# Patient Record
Sex: FEMALE | Race: ASIAN | NOT HISPANIC OR LATINO
[De-identification: names, ages, dates, MRNs, and addresses within clinical notes are randomized per-mention and may not be internally consistent; named-entity substitution may affect disease eponyms.]

---

## 2018-08-23 ENCOUNTER — TRANSCRIPTION ENCOUNTER (OUTPATIENT)
Age: 38
End: 2018-08-23

## 2018-08-23 ENCOUNTER — LABORATORY RESULT (OUTPATIENT)
Age: 38
End: 2018-08-23

## 2018-08-23 ENCOUNTER — APPOINTMENT (OUTPATIENT)
Dept: INTERNAL MEDICINE | Facility: CLINIC | Age: 38
End: 2018-08-23
Payer: COMMERCIAL

## 2018-08-23 VITALS
BODY MASS INDEX: 21.71 KG/M2 | WEIGHT: 118 LBS | TEMPERATURE: 97.8 F | SYSTOLIC BLOOD PRESSURE: 104 MMHG | DIASTOLIC BLOOD PRESSURE: 72 MMHG | HEIGHT: 62 IN

## 2018-08-23 DIAGNOSIS — Z78.9 OTHER SPECIFIED HEALTH STATUS: ICD-10-CM

## 2018-08-23 DIAGNOSIS — Z82.3 FAMILY HISTORY OF STROKE: ICD-10-CM

## 2018-08-23 DIAGNOSIS — Z83.3 FAMILY HISTORY OF DIABETES MELLITUS: ICD-10-CM

## 2018-08-23 PROCEDURE — 90472 IMMUNIZATION ADMIN EACH ADD: CPT

## 2018-08-23 PROCEDURE — 99385 PREV VISIT NEW AGE 18-39: CPT | Mod: 25

## 2018-08-23 PROCEDURE — 36415 COLL VENOUS BLD VENIPUNCTURE: CPT

## 2018-08-23 PROCEDURE — 90632 HEPA VACCINE ADULT IM: CPT

## 2018-08-23 PROCEDURE — 90715 TDAP VACCINE 7 YRS/> IM: CPT

## 2018-08-23 PROCEDURE — 90471 IMMUNIZATION ADMIN: CPT

## 2018-08-23 RX ORDER — CHROMIUM 200 MCG
TABLET ORAL
Refills: 0 | Status: ACTIVE | COMMUNITY

## 2018-08-23 NOTE — HEALTH RISK ASSESSMENT
[Good] : ~his/her~  mood as  good [0] : 2) Feeling down, depressed, or hopeless: Not at all (0) [HIV Test offered] : HIV Test offered [Hepatitis C test offered] : Hepatitis C test offered [With Family] : lives with family [Single] : single [] : No [de-identified] : social [[id number]] : 0 [Sexually Active] : not sexually active [Reports changes in hearing] : Reports no changes in hearing [Reports changes in vision] : Reports no changes in vision [Reports changes in dental health] : Reports no changes in dental health [de-identified] : glasses

## 2018-08-23 NOTE — PHYSICAL EXAM
[No Acute Distress] : no acute distress [Well Nourished] : well nourished [Well Developed] : well developed [Well-Appearing] : well-appearing [Normal Sclera/Conjunctiva] : normal sclera/conjunctiva [PERRL] : pupils equal round and reactive to light [EOMI] : extraocular movements intact [Normal Outer Ear/Nose] : the outer ears and nose were normal in appearance [Normal Oropharynx] : the oropharynx was normal [Normal TMs] : both tympanic membranes were normal [No JVD] : no jugular venous distention [Supple] : supple [No Lymphadenopathy] : no lymphadenopathy [Thyroid Normal, No Nodules] : the thyroid was normal and there were no nodules present [No Respiratory Distress] : no respiratory distress  [Clear to Auscultation] : lungs were clear to auscultation bilaterally [No Accessory Muscle Use] : no accessory muscle use [Normal Rate] : normal rate  [Regular Rhythm] : with a regular rhythm [Normal S1, S2] : normal S1 and S2 [No Murmur] : no murmur heard [No Carotid Bruits] : no carotid bruits [No Abdominal Bruit] : a ~M bruit was not heard ~T in the abdomen [No Varicosities] : no varicosities [Pedal Pulses Present] : the pedal pulses are present [No Edema] : there was no peripheral edema [No Extremity Clubbing/Cyanosis] : no extremity clubbing/cyanosis [No Palpable Aorta] : no palpable aorta [Normal Appearance] : normal in appearance [No Nipple Discharge] : no nipple discharge [No Axillary Lymphadenopathy] : no axillary lymphadenopathy [Soft] : abdomen soft [Non Tender] : non-tender [Non-distended] : non-distended [No Masses] : no abdominal mass palpated [No HSM] : no HSM [Normal Bowel Sounds] : normal bowel sounds [Normal Supraclavicular Nodes] : no supraclavicular lymphadenopathy [Normal Posterior Cervical Nodes] : no posterior cervical lymphadenopathy [Normal Anterior Cervical Nodes] : no anterior cervical lymphadenopathy [Normal Inguinal Nodes] : no inguinal lymphadenopathy [No CVA Tenderness] : no CVA  tenderness [No Spinal Tenderness] : no spinal tenderness [No Joint Swelling] : no joint swelling [Grossly Normal Strength/Tone] : grossly normal strength/tone [No Rash] : no rash [No Skin Lesions] : no skin lesions [Normal Gait] : normal gait [Coordination Grossly Intact] : coordination grossly intact [No Focal Deficits] : no focal deficits [Deep Tendon Reflexes (DTR)] : deep tendon reflexes were 2+ and symmetric [Normal Affect] : the affect was normal [Alert and Oriented x3] : oriented to person, place, and time [Normal Insight/Judgement] : insight and judgment were intact [de-identified] : right breast 4cm mass at 1200

## 2018-08-23 NOTE — PLAN
[FreeTextEntry1] : Depression screening negative\par Check labs today including HIV/STDs. Will also check titers.\par Hepatitis A vaccine today. Return 6 months for second dose\par Tdap today\par Given a prescription for a mammogram and breast ultrasound.\par Appointment scheduled with gynecology\par

## 2018-08-23 NOTE — HISTORY OF PRESENT ILLNESS
[FreeTextEntry1] : annual physical [de-identified] : 38-year-old female with no past medical history who is here for a annual physical. She comes to the office frequently however with her father. Her mother was also a patient here.\par She travels a lot for pleasure. She knows that she is not immune to hepatitis A and would like to get hepatitis A vaccine because of her travel. She also notes that her tetanus shot is not up to date. She vaguely remembers not being immune to hepatitis B or MMR about 10 years ago.\par She has not seen a gynecologist in several years but has an appointment scheduled for next month.\par Recently she felt a lump in her right breast.

## 2018-08-26 ENCOUNTER — FORM ENCOUNTER (OUTPATIENT)
Age: 38
End: 2018-08-26

## 2018-08-27 ENCOUNTER — APPOINTMENT (OUTPATIENT)
Dept: MAMMOGRAPHY | Facility: CLINIC | Age: 38
End: 2018-08-27
Payer: COMMERCIAL

## 2018-08-27 ENCOUNTER — OUTPATIENT (OUTPATIENT)
Dept: OUTPATIENT SERVICES | Facility: HOSPITAL | Age: 38
LOS: 1 days | End: 2018-08-27
Payer: COMMERCIAL

## 2018-08-27 ENCOUNTER — APPOINTMENT (OUTPATIENT)
Dept: ULTRASOUND IMAGING | Facility: CLINIC | Age: 38
End: 2018-08-27
Payer: COMMERCIAL

## 2018-08-27 DIAGNOSIS — R92.8 OTHER ABNORMAL AND INCONCLUSIVE FINDINGS ON DIAGNOSTIC IMAGING OF BREAST: ICD-10-CM

## 2018-08-27 PROCEDURE — G0279: CPT

## 2018-08-27 PROCEDURE — 76641 ULTRASOUND BREAST COMPLETE: CPT | Mod: 26,50

## 2018-08-27 PROCEDURE — 76641 ULTRASOUND BREAST COMPLETE: CPT

## 2018-08-27 PROCEDURE — G0279: CPT | Mod: 26

## 2018-08-27 PROCEDURE — 77066 DX MAMMO INCL CAD BI: CPT | Mod: 26

## 2018-08-27 PROCEDURE — 77066 DX MAMMO INCL CAD BI: CPT

## 2018-08-29 ENCOUNTER — TRANSCRIPTION ENCOUNTER (OUTPATIENT)
Age: 38
End: 2018-08-29

## 2018-08-29 LAB
25(OH)D3 SERPL-MCNC: 38.7 NG/ML
ALBUMIN SERPL ELPH-MCNC: 4.7 G/DL
ALP BLD-CCNC: 47 U/L
ALT SERPL-CCNC: 6 U/L
ANION GAP SERPL CALC-SCNC: 13 MMOL/L
APPEARANCE: CLEAR
AST SERPL-CCNC: 19 U/L
BACTERIA: NEGATIVE
BASOPHILS # BLD AUTO: 0.02 K/UL
BASOPHILS NFR BLD AUTO: 0.3 %
BILIRUB SERPL-MCNC: 0.3 MG/DL
BILIRUBIN URINE: NEGATIVE
BLOOD URINE: NEGATIVE
BUN SERPL-MCNC: 11 MG/DL
C TRACH RRNA SPEC QL NAA+PROBE: NOT DETECTED
CALCIUM SERPL-MCNC: 9.7 MG/DL
CHLORIDE SERPL-SCNC: 99 MMOL/L
CHOLEST SERPL-MCNC: 161 MG/DL
CHOLEST/HDLC SERPL: 4 RATIO
CO2 SERPL-SCNC: 26 MMOL/L
COLOR: YELLOW
CREAT SERPL-MCNC: 0.91 MG/DL
EOSINOPHIL # BLD AUTO: 0.25 K/UL
EOSINOPHIL NFR BLD AUTO: 3.6 %
FERRITIN SERPL-MCNC: 55 NG/ML
GLUCOSE QUALITATIVE U: NEGATIVE MG/DL
GLUCOSE SERPL-MCNC: 84 MG/DL
HBA1C MFR BLD HPLC: 5.3 %
HBV SURFACE AB SER QL: REACTIVE
HBV SURFACE AB SERPL IA-ACNC: 32.6 MIU/ML
HBV SURFACE AG SER QL: NONREACTIVE
HCT VFR BLD CALC: 43.1 %
HCV AB SER QL: ABNORMAL
HCV S/CO RATIO: 1.03 S/CO
HDLC SERPL-MCNC: 40 MG/DL
HGB BLD-MCNC: 13.5 G/DL
HIV1+2 AB SPEC QL IA.RAPID: NONREACTIVE
HYALINE CASTS: 0 /LPF
IMM GRANULOCYTES NFR BLD AUTO: 0.1 %
IRON SATN MFR SERPL: 32 %
IRON SERPL-MCNC: 114 UG/DL
KETONES URINE: NEGATIVE
LDLC SERPL CALC-MCNC: 77 MG/DL
LEUKOCYTE ESTERASE URINE: NEGATIVE
LYMPHOCYTES # BLD AUTO: 1.88 K/UL
LYMPHOCYTES NFR BLD AUTO: 27 %
MAN DIFF?: NORMAL
MCHC RBC-ENTMCNC: 29.2 PG
MCHC RBC-ENTMCNC: 31.3 GM/DL
MCV RBC AUTO: 93.1 FL
MEV IGG FLD QL IA: <5 AU/ML
MEV IGG+IGM SER-IMP: NEGATIVE
MICROSCOPIC-UA: NORMAL
MONOCYTES # BLD AUTO: 0.46 K/UL
MONOCYTES NFR BLD AUTO: 6.6 %
MUV AB SER-ACNC: NEGATIVE
MUV IGG SER QL IA: <5 AU/ML
N GONORRHOEA RRNA SPEC QL NAA+PROBE: NOT DETECTED
NEUTROPHILS # BLD AUTO: 4.35 K/UL
NEUTROPHILS NFR BLD AUTO: 62.4 %
NITRITE URINE: NEGATIVE
PH URINE: 7
PLATELET # BLD AUTO: 300 K/UL
POTASSIUM SERPL-SCNC: 4 MMOL/L
PROT SERPL-MCNC: 7.8 G/DL
PROTEIN URINE: NEGATIVE MG/DL
RBC # BLD: 4.63 M/UL
RBC # FLD: 13.4 %
RED BLOOD CELLS URINE: 2 /HPF
RUBV IGG FLD-ACNC: 0.3 INDEX
RUBV IGG SER-IMP: NEGATIVE
SODIUM SERPL-SCNC: 138 MMOL/L
SOURCE AMPLIFICATION: NORMAL
SPECIFIC GRAVITY URINE: 1.01
SQUAMOUS EPITHELIAL CELLS: 0 /HPF
T PALLIDUM AB SER QL IA: NEGATIVE
TIBC SERPL-MCNC: 354 UG/DL
TRIGL SERPL-MCNC: 218 MG/DL
TSH SERPL-ACNC: 1.37 UIU/ML
UIBC SERPL-MCNC: 240 UG/DL
UROBILINOGEN URINE: NEGATIVE MG/DL
VIT B12 SERPL-MCNC: 1004 PG/ML
VZV AB TITR SER: POSITIVE
VZV IGG SER IF-ACNC: 982.3 INDEX
WBC # FLD AUTO: 6.97 K/UL
WHITE BLOOD CELLS URINE: 0 /HPF

## 2018-08-31 ENCOUNTER — TRANSCRIPTION ENCOUNTER (OUTPATIENT)
Age: 38
End: 2018-08-31

## 2018-09-06 ENCOUNTER — APPOINTMENT (OUTPATIENT)
Dept: GASTROENTEROLOGY | Facility: CLINIC | Age: 38
End: 2018-09-06
Payer: COMMERCIAL

## 2018-09-06 VITALS
HEART RATE: 91 BPM | RESPIRATION RATE: 16 BRPM | OXYGEN SATURATION: 98 % | HEIGHT: 62 IN | SYSTOLIC BLOOD PRESSURE: 116 MMHG | DIASTOLIC BLOOD PRESSURE: 87 MMHG | WEIGHT: 122 LBS | BODY MASS INDEX: 22.45 KG/M2

## 2018-09-06 PROCEDURE — 99204 OFFICE O/P NEW MOD 45 MIN: CPT

## 2018-09-24 ENCOUNTER — APPOINTMENT (OUTPATIENT)
Dept: HEPATOLOGY | Facility: CLINIC | Age: 38
End: 2018-09-24

## 2018-10-15 ENCOUNTER — LABORATORY RESULT (OUTPATIENT)
Age: 38
End: 2018-10-15

## 2018-10-17 LAB
ALBUMIN SERPL ELPH-MCNC: 4.6 G/DL
ALP BLD-CCNC: 45 U/L
ALT SERPL-CCNC: 11 U/L
ANION GAP SERPL CALC-SCNC: 12 MMOL/L
AST SERPL-CCNC: 18 U/L
BILIRUB SERPL-MCNC: 0.3 MG/DL
BUN SERPL-MCNC: 14 MG/DL
CALCIUM SERPL-MCNC: 9.9 MG/DL
CHLORIDE SERPL-SCNC: 103 MMOL/L
CO2 SERPL-SCNC: 28 MMOL/L
CREAT SERPL-MCNC: 0.92 MG/DL
HBV CORE IGG+IGM SER QL: NONREACTIVE
HCV AB SER QL: ABNORMAL
HCV S/CO RATIO: 1.01 S/CO
POTASSIUM SERPL-SCNC: 4.7 MMOL/L
PROT SERPL-MCNC: 7.6 G/DL
SODIUM SERPL-SCNC: 143 MMOL/L

## 2019-02-01 ENCOUNTER — OTHER (OUTPATIENT)
Age: 39
End: 2019-02-01

## 2019-05-08 ENCOUNTER — APPOINTMENT (OUTPATIENT)
Dept: INTERNAL MEDICINE | Facility: CLINIC | Age: 39
End: 2019-05-08
Payer: COMMERCIAL

## 2019-05-08 VITALS
TEMPERATURE: 98.6 F | HEIGHT: 62 IN | DIASTOLIC BLOOD PRESSURE: 70 MMHG | WEIGHT: 123 LBS | SYSTOLIC BLOOD PRESSURE: 98 MMHG | BODY MASS INDEX: 22.63 KG/M2

## 2019-05-08 LAB — HCG UR QL: NEGATIVE

## 2019-05-08 PROCEDURE — 90472 IMMUNIZATION ADMIN EACH ADD: CPT

## 2019-05-08 PROCEDURE — 90471 IMMUNIZATION ADMIN: CPT

## 2019-05-08 PROCEDURE — 90707 MMR VACCINE SC: CPT

## 2019-05-08 PROCEDURE — 90632 HEPA VACCINE ADULT IM: CPT

## 2019-05-08 PROCEDURE — 99212 OFFICE O/P EST SF 10 MIN: CPT | Mod: 25

## 2019-05-08 NOTE — HISTORY OF PRESENT ILLNESS
[FreeTextEntry1] : Hepatitis A and MMR vaccinations [de-identified] : She had hepatitis A vaccination done in August. At that time her physical she had MMR titers which show that she is not immune to any of them. She is here today to have a second hepatitis A and MMR vaccines. She feels well today. no fever/chills

## 2019-05-08 NOTE — PLAN
[FreeTextEntry1] : Hepatitis A and MMR vaccines discussed with patient. Urine pregnancy test negative. Both excellent administered today. Return in one month for second MMR

## 2020-02-06 ENCOUNTER — TRANSCRIPTION ENCOUNTER (OUTPATIENT)
Age: 40
End: 2020-02-06

## 2020-03-11 ENCOUNTER — APPOINTMENT (OUTPATIENT)
Dept: INTERNAL MEDICINE | Facility: CLINIC | Age: 40
End: 2020-03-11
Payer: COMMERCIAL

## 2020-03-11 VITALS
RESPIRATION RATE: 18 BRPM | OXYGEN SATURATION: 99 % | TEMPERATURE: 98.1 F | WEIGHT: 126 LBS | HEART RATE: 81 BPM | BODY MASS INDEX: 23.19 KG/M2 | SYSTOLIC BLOOD PRESSURE: 100 MMHG | HEIGHT: 62 IN | DIASTOLIC BLOOD PRESSURE: 70 MMHG

## 2020-03-11 PROCEDURE — 99395 PREV VISIT EST AGE 18-39: CPT | Mod: 25

## 2020-03-11 PROCEDURE — 36415 COLL VENOUS BLD VENIPUNCTURE: CPT

## 2020-03-11 NOTE — PHYSICAL EXAM
[No Acute Distress] : no acute distress [Well Nourished] : well nourished [Well Developed] : well developed [Well-Appearing] : well-appearing [Normal Sclera/Conjunctiva] : normal sclera/conjunctiva [PERRL] : pupils equal round and reactive to light [EOMI] : extraocular movements intact [Normal Outer Ear/Nose] : the outer ears and nose were normal in appearance [Normal Oropharynx] : the oropharynx was normal [Normal TMs] : both tympanic membranes were normal [No JVD] : no jugular venous distention [No Lymphadenopathy] : no lymphadenopathy [Supple] : supple [Thyroid Normal, No Nodules] : the thyroid was normal and there were no nodules present [No Respiratory Distress] : no respiratory distress  [No Accessory Muscle Use] : no accessory muscle use [Clear to Auscultation] : lungs were clear to auscultation bilaterally [Normal Rate] : normal rate  [Regular Rhythm] : with a regular rhythm [Normal S1, S2] : normal S1 and S2 [No Murmur] : no murmur heard [No Carotid Bruits] : no carotid bruits [No Varicosities] : no varicosities [Pedal Pulses Present] : the pedal pulses are present [No Edema] : there was no peripheral edema [No Extremity Clubbing/Cyanosis] : no extremity clubbing/cyanosis [Normal Appearance] : normal in appearance [No Nipple Discharge] : no nipple discharge [No Axillary Lymphadenopathy] : no axillary lymphadenopathy [Soft] : abdomen soft [Non Tender] : non-tender [Non-distended] : non-distended [No Masses] : no abdominal mass palpated [No HSM] : no HSM [Normal Bowel Sounds] : normal bowel sounds [Normal Posterior Cervical Nodes] : no posterior cervical lymphadenopathy [Normal Anterior Cervical Nodes] : no anterior cervical lymphadenopathy [No CVA Tenderness] : no CVA  tenderness [No Spinal Tenderness] : no spinal tenderness [No Joint Swelling] : no joint swelling [Grossly Normal Strength/Tone] : grossly normal strength/tone [No Rash] : no rash [Coordination Grossly Intact] : coordination grossly intact [No Focal Deficits] : no focal deficits [Normal Gait] : normal gait [Deep Tendon Reflexes (DTR)] : deep tendon reflexes were 2+ and symmetric [Normal Affect] : the affect was normal [Alert and Oriented x3] : oriented to person, place, and time [Normal Insight/Judgement] : insight and judgment were intact

## 2020-03-11 NOTE — HISTORY OF PRESENT ILLNESS
[FreeTextEntry1] : Annual physical [de-identified] : 31-year-old female who is here for an annual physical. At that time her last physical that hepatitis C came back weakly positive. She followed up with GI who thought it was likely a false positive and told her to repeat it in "a couple of years" \par Her father has been in and out of the hospital. Therefore she has not been getting as much exercise as she usually does. She has put on a little weight over the last couple of years.

## 2020-03-11 NOTE — HEALTH RISK ASSESSMENT
[] : No [0] : 2) Feeling down, depressed, or hopeless: Not at all (0) [de-identified] : social [[id number]] : 0 [Patient reported mammogram was normal] : Patient reported mammogram was normal [None] : None [With Family] : lives with family [Employed] : employed [Single] : single [MammogramDate] : 01/19 [FreeTextEntry2] :

## 2020-03-11 NOTE — PLAN
[FreeTextEntry1] : Depression screening negative\par BP is controlled\par Check labs today, including CBC, CMP, TSH, HbA1c, lipids. declined repeat Hep C testing today\par Blood was collected in the office.\par Continue exercise\par pap UTD\par f/u 1 year or prn\par

## 2020-03-17 ENCOUNTER — TRANSCRIPTION ENCOUNTER (OUTPATIENT)
Age: 40
End: 2020-03-17

## 2020-03-17 LAB
25(OH)D3 SERPL-MCNC: 38.8 NG/ML
ALBUMIN SERPL ELPH-MCNC: 4.6 G/DL
ALP BLD-CCNC: 49 U/L
ALT SERPL-CCNC: 7 U/L
ANION GAP SERPL CALC-SCNC: 14 MMOL/L
APPEARANCE: CLEAR
AST SERPL-CCNC: 13 U/L
BACTERIA: NEGATIVE
BASOPHILS # BLD AUTO: 0.03 K/UL
BASOPHILS NFR BLD AUTO: 0.5 %
BILIRUB SERPL-MCNC: 0.3 MG/DL
BILIRUBIN URINE: NEGATIVE
BLOOD URINE: NEGATIVE
BUN SERPL-MCNC: 11 MG/DL
CALCIUM SERPL-MCNC: 9.3 MG/DL
CHLORIDE SERPL-SCNC: 102 MMOL/L
CHOLEST SERPL-MCNC: 175 MG/DL
CHOLEST/HDLC SERPL: 3.3 RATIO
CO2 SERPL-SCNC: 23 MMOL/L
COLOR: NORMAL
CREAT SERPL-MCNC: 0.87 MG/DL
EOSINOPHIL # BLD AUTO: 0.22 K/UL
EOSINOPHIL NFR BLD AUTO: 3.9 %
ESTIMATED AVERAGE GLUCOSE: 111 MG/DL
GLUCOSE QUALITATIVE U: NEGATIVE
GLUCOSE SERPL-MCNC: 96 MG/DL
HBA1C MFR BLD HPLC: 5.5 %
HCT VFR BLD CALC: 40.5 %
HDLC SERPL-MCNC: 52 MG/DL
HGB BLD-MCNC: 13.3 G/DL
HYALINE CASTS: 0 /LPF
IMM GRANULOCYTES NFR BLD AUTO: 0.2 %
KETONES URINE: NEGATIVE
LDLC SERPL CALC-MCNC: 82 MG/DL
LEUKOCYTE ESTERASE URINE: NEGATIVE
LYMPHOCYTES # BLD AUTO: 1.63 K/UL
LYMPHOCYTES NFR BLD AUTO: 28.7 %
MAN DIFF?: NORMAL
MCHC RBC-ENTMCNC: 31 PG
MCHC RBC-ENTMCNC: 32.8 GM/DL
MCV RBC AUTO: 94.4 FL
MICROSCOPIC-UA: NORMAL
MONOCYTES # BLD AUTO: 0.47 K/UL
MONOCYTES NFR BLD AUTO: 8.3 %
NEUTROPHILS # BLD AUTO: 3.31 K/UL
NEUTROPHILS NFR BLD AUTO: 58.4 %
NITRITE URINE: NEGATIVE
PH URINE: 6.5
PLATELET # BLD AUTO: 222 K/UL
POTASSIUM SERPL-SCNC: 3.9 MMOL/L
PROT SERPL-MCNC: 7.3 G/DL
PROTEIN URINE: NEGATIVE
RBC # BLD: 4.29 M/UL
RBC # FLD: 13.2 %
RED BLOOD CELLS URINE: 2 /HPF
SODIUM SERPL-SCNC: 139 MMOL/L
SPECIFIC GRAVITY URINE: 1.01
SQUAMOUS EPITHELIAL CELLS: 0 /HPF
TRIGL SERPL-MCNC: 203 MG/DL
TSH SERPL-ACNC: 2.25 UIU/ML
UROBILINOGEN URINE: NORMAL
WBC # FLD AUTO: 5.67 K/UL
WHITE BLOOD CELLS URINE: 1 /HPF

## 2020-05-15 ENCOUNTER — TRANSCRIPTION ENCOUNTER (OUTPATIENT)
Age: 40
End: 2020-05-15

## 2020-07-09 ENCOUNTER — APPOINTMENT (OUTPATIENT)
Dept: INTERNAL MEDICINE | Facility: CLINIC | Age: 40
End: 2020-07-09
Payer: COMMERCIAL

## 2020-07-09 VITALS
BODY MASS INDEX: 23.19 KG/M2 | WEIGHT: 126 LBS | HEIGHT: 62 IN | TEMPERATURE: 97.3 F | DIASTOLIC BLOOD PRESSURE: 70 MMHG | SYSTOLIC BLOOD PRESSURE: 110 MMHG

## 2020-07-09 PROCEDURE — 99214 OFFICE O/P EST MOD 30 MIN: CPT

## 2020-07-09 NOTE — PHYSICAL EXAM
[No Acute Distress] : no acute distress [Well Nourished] : well nourished [Normal Sclera/Conjunctiva] : normal sclera/conjunctiva [Normal Outer Ear/Nose] : the outer ears and nose were normal in appearance [Normal Appearance] : normal in appearance [No Axillary Lymphadenopathy] : no axillary lymphadenopathy [Declined Rectal Exam] : declined rectal exam [Coordination Grossly Intact] : coordination grossly intact [Normal Gait] : normal gait [Normal Affect] : the affect was normal [de-identified] : palpable breast nodules b/l [Normal Insight/Judgement] : insight and judgment were intact [de-identified] : motor 5/5 b/l LE

## 2020-07-09 NOTE — HISTORY OF PRESENT ILLNESS
[FreeTextEntry8] : 10-year-old female who presented for a tetanus shot.  She states that she is working in her garden a lot and getting a lot of cuts and she did not think that she had had a tetanus shot for more than 10 years.\par She complains about having hard bowel movements.  She states that she has been eating more cheese and since she started doing that her bowel movements are harder.  She states she is not constipated but has noted for the last couple days that there is blood on the toilet paper and a little bit of bright red blood on the end of her stool.  It is not mixed into the stool.  She denies abdominal pain.\par She had oral surgery and lost some weight. She did not exercise for a while during the pandemic. She went running recently and her legs felt heavy.\par She noted when she lost weight that her breasts are not equal in size

## 2020-07-09 NOTE — PLAN
[FreeTextEntry1] : She had a Tdap done in 2018\par Likely has a anal fissure. She declined examination. Increase fiber/water\par will get mammo and breast US\par Likely legs feel tired from not exercising regularly. COntiue exercise. if it persists can check labs

## 2020-10-20 ENCOUNTER — APPOINTMENT (OUTPATIENT)
Dept: INTERNAL MEDICINE | Facility: CLINIC | Age: 40
End: 2020-10-20
Payer: COMMERCIAL

## 2020-10-20 VITALS
OXYGEN SATURATION: 99 % | BODY MASS INDEX: 22.08 KG/M2 | SYSTOLIC BLOOD PRESSURE: 100 MMHG | HEART RATE: 77 BPM | WEIGHT: 120 LBS | TEMPERATURE: 98.8 F | HEIGHT: 62 IN | DIASTOLIC BLOOD PRESSURE: 80 MMHG

## 2020-10-20 PROCEDURE — 99213 OFFICE O/P EST LOW 20 MIN: CPT | Mod: 25

## 2020-10-20 PROCEDURE — 99072 ADDL SUPL MATRL&STAF TM PHE: CPT

## 2020-10-20 NOTE — HISTORY OF PRESENT ILLNESS
[FreeTextEntry8] : She twisted her left ankle while walking 3 days ago. The ankle for very swollen. She rested and elevated the leg. The swelling is much  better and she is able to ambulate but she still has some pain. She is able to wear a shoe.

## 2020-10-20 NOTE — PHYSICAL EXAM
[de-identified] : swelling of left lateral and medial malleoli, minimal ecchymosis of medial foot, mild point tenderness of lateral  malleolus. normal ROM of ankle. able to ambulate [Well Nourished] : well nourished [No Acute Distress] : no acute distress

## 2020-10-22 ENCOUNTER — TRANSCRIPTION ENCOUNTER (OUTPATIENT)
Age: 40
End: 2020-10-22

## 2020-11-09 ENCOUNTER — APPOINTMENT (OUTPATIENT)
Dept: ULTRASOUND IMAGING | Facility: CLINIC | Age: 40
End: 2020-11-09
Payer: COMMERCIAL

## 2020-11-09 ENCOUNTER — RESULT REVIEW (OUTPATIENT)
Age: 40
End: 2020-11-09

## 2020-11-09 ENCOUNTER — APPOINTMENT (OUTPATIENT)
Dept: MAMMOGRAPHY | Facility: CLINIC | Age: 40
End: 2020-11-09
Payer: COMMERCIAL

## 2020-11-09 ENCOUNTER — OUTPATIENT (OUTPATIENT)
Dept: OUTPATIENT SERVICES | Facility: HOSPITAL | Age: 40
LOS: 1 days | End: 2020-11-09
Payer: COMMERCIAL

## 2020-11-09 DIAGNOSIS — R92.8 OTHER ABNORMAL AND INCONCLUSIVE FINDINGS ON DIAGNOSTIC IMAGING OF BREAST: ICD-10-CM

## 2020-11-09 DIAGNOSIS — Z00.00 ENCOUNTER FOR GENERAL ADULT MEDICAL EXAMINATION WITHOUT ABNORMAL FINDINGS: ICD-10-CM

## 2020-11-09 PROCEDURE — G0279: CPT

## 2020-11-09 PROCEDURE — 77066 DX MAMMO INCL CAD BI: CPT | Mod: 26

## 2020-11-09 PROCEDURE — G0279: CPT | Mod: 26

## 2020-11-09 PROCEDURE — 76641 ULTRASOUND BREAST COMPLETE: CPT | Mod: 26,50

## 2020-11-09 PROCEDURE — 77066 DX MAMMO INCL CAD BI: CPT

## 2020-11-09 PROCEDURE — 76641 ULTRASOUND BREAST COMPLETE: CPT

## 2020-11-11 ENCOUNTER — TRANSCRIPTION ENCOUNTER (OUTPATIENT)
Age: 40
End: 2020-11-11

## 2021-03-01 ENCOUNTER — NON-APPOINTMENT (OUTPATIENT)
Age: 41
End: 2021-03-01

## 2021-05-03 ENCOUNTER — TRANSCRIPTION ENCOUNTER (OUTPATIENT)
Age: 41
End: 2021-05-03

## 2021-05-04 ENCOUNTER — TRANSCRIPTION ENCOUNTER (OUTPATIENT)
Age: 41
End: 2021-05-04

## 2021-05-05 ENCOUNTER — APPOINTMENT (OUTPATIENT)
Dept: INTERNAL MEDICINE | Facility: CLINIC | Age: 41
End: 2021-05-05

## 2021-05-21 ENCOUNTER — EMERGENCY (EMERGENCY)
Facility: HOSPITAL | Age: 41
LOS: 1 days | Discharge: HOME | End: 2021-05-21
Attending: EMERGENCY MEDICINE
Payer: COMMERCIAL

## 2021-05-21 VITALS
DIASTOLIC BLOOD PRESSURE: 90 MMHG | WEIGHT: 126.1 LBS | RESPIRATION RATE: 18 BRPM | HEART RATE: 100 BPM | OXYGEN SATURATION: 98 % | HEIGHT: 62 IN | TEMPERATURE: 98 F | SYSTOLIC BLOOD PRESSURE: 133 MMHG

## 2021-05-21 LAB
ALBUMIN SERPL ELPH-MCNC: 4.8 G/DL — SIGNIFICANT CHANGE UP (ref 3.3–5)
ALP SERPL-CCNC: 52 U/L — SIGNIFICANT CHANGE UP (ref 40–120)
ALT FLD-CCNC: 10 U/L — SIGNIFICANT CHANGE UP (ref 10–45)
ANION GAP SERPL CALC-SCNC: 16 MMOL/L — SIGNIFICANT CHANGE UP (ref 5–17)
APTT BLD: 33.3 SEC — SIGNIFICANT CHANGE UP (ref 27.5–35.5)
AST SERPL-CCNC: 12 U/L — SIGNIFICANT CHANGE UP (ref 10–40)
BASOPHILS # BLD AUTO: 0.02 K/UL — SIGNIFICANT CHANGE UP (ref 0–0.2)
BASOPHILS NFR BLD AUTO: 0.2 % — SIGNIFICANT CHANGE UP (ref 0–2)
BILIRUB SERPL-MCNC: 0.6 MG/DL — SIGNIFICANT CHANGE UP (ref 0.2–1.2)
BUN SERPL-MCNC: 14 MG/DL — SIGNIFICANT CHANGE UP (ref 7–23)
CALCIUM SERPL-MCNC: 10.1 MG/DL — SIGNIFICANT CHANGE UP (ref 8.4–10.5)
CHLORIDE SERPL-SCNC: 98 MMOL/L — SIGNIFICANT CHANGE UP (ref 96–108)
CO2 SERPL-SCNC: 23 MMOL/L — SIGNIFICANT CHANGE UP (ref 22–31)
CREAT SERPL-MCNC: 0.89 MG/DL — SIGNIFICANT CHANGE UP (ref 0.5–1.3)
EOSINOPHIL # BLD AUTO: 0.04 K/UL — SIGNIFICANT CHANGE UP (ref 0–0.5)
EOSINOPHIL NFR BLD AUTO: 0.3 % — SIGNIFICANT CHANGE UP (ref 0–6)
GLUCOSE SERPL-MCNC: 84 MG/DL — SIGNIFICANT CHANGE UP (ref 70–99)
HCT VFR BLD CALC: 42.5 % — SIGNIFICANT CHANGE UP (ref 34.5–45)
HGB BLD-MCNC: 13.7 G/DL — SIGNIFICANT CHANGE UP (ref 11.5–15.5)
IMM GRANULOCYTES NFR BLD AUTO: 0.5 % — SIGNIFICANT CHANGE UP (ref 0–1.5)
INR BLD: 1.1 RATIO — SIGNIFICANT CHANGE UP (ref 0.88–1.16)
LACTATE BLDV-MCNC: 1.7 MMOL/L — SIGNIFICANT CHANGE UP (ref 0.7–2)
LYMPHOCYTES # BLD AUTO: 1.37 K/UL — SIGNIFICANT CHANGE UP (ref 1–3.3)
LYMPHOCYTES # BLD AUTO: 12 % — LOW (ref 13–44)
MCHC RBC-ENTMCNC: 29.5 PG — SIGNIFICANT CHANGE UP (ref 27–34)
MCHC RBC-ENTMCNC: 32.2 GM/DL — SIGNIFICANT CHANGE UP (ref 32–36)
MCV RBC AUTO: 91.4 FL — SIGNIFICANT CHANGE UP (ref 80–100)
MONOCYTES # BLD AUTO: 0.95 K/UL — HIGH (ref 0–0.9)
MONOCYTES NFR BLD AUTO: 8.3 % — SIGNIFICANT CHANGE UP (ref 2–14)
NEUTROPHILS # BLD AUTO: 8.99 K/UL — HIGH (ref 1.8–7.4)
NEUTROPHILS NFR BLD AUTO: 78.7 % — HIGH (ref 43–77)
NRBC # BLD: 0 /100 WBCS — SIGNIFICANT CHANGE UP (ref 0–0)
PLATELET # BLD AUTO: 240 K/UL — SIGNIFICANT CHANGE UP (ref 150–400)
POTASSIUM SERPL-MCNC: 3.7 MMOL/L — SIGNIFICANT CHANGE UP (ref 3.5–5.3)
POTASSIUM SERPL-SCNC: 3.7 MMOL/L — SIGNIFICANT CHANGE UP (ref 3.5–5.3)
PROCALCITONIN SERPL-MCNC: 0.08 NG/ML — SIGNIFICANT CHANGE UP (ref 0.02–0.1)
PROT SERPL-MCNC: 8.4 G/DL — HIGH (ref 6–8.3)
PROTHROM AB SERPL-ACNC: 13.1 SEC — SIGNIFICANT CHANGE UP (ref 10.6–13.6)
RBC # BLD: 4.65 M/UL — SIGNIFICANT CHANGE UP (ref 3.8–5.2)
RBC # FLD: 12.7 % — SIGNIFICANT CHANGE UP (ref 10.3–14.5)
SARS-COV-2 RNA SPEC QL NAA+PROBE: SIGNIFICANT CHANGE UP
SODIUM SERPL-SCNC: 137 MMOL/L — SIGNIFICANT CHANGE UP (ref 135–145)
WBC # BLD: 11.43 K/UL — HIGH (ref 3.8–10.5)
WBC # FLD AUTO: 11.43 K/UL — HIGH (ref 3.8–10.5)

## 2021-05-21 PROCEDURE — 76642 ULTRASOUND BREAST LIMITED: CPT | Mod: 26,LT

## 2021-05-21 PROCEDURE — 99220: CPT

## 2021-05-21 RX ORDER — KETOROLAC TROMETHAMINE 30 MG/ML
15 SYRINGE (ML) INJECTION ONCE
Refills: 0 | Status: DISCONTINUED | OUTPATIENT
Start: 2021-05-21 | End: 2021-05-21

## 2021-05-21 RX ORDER — ONDANSETRON 8 MG/1
4 TABLET, FILM COATED ORAL ONCE
Refills: 0 | Status: DISCONTINUED | OUTPATIENT
Start: 2021-05-21 | End: 2021-05-21

## 2021-05-21 RX ORDER — SODIUM CHLORIDE 9 MG/ML
1000 INJECTION INTRAMUSCULAR; INTRAVENOUS; SUBCUTANEOUS ONCE
Refills: 0 | Status: COMPLETED | OUTPATIENT
Start: 2021-05-21 | End: 2021-05-21

## 2021-05-21 RX ORDER — AMPICILLIN SODIUM AND SULBACTAM SODIUM 250; 125 MG/ML; MG/ML
3 INJECTION, POWDER, FOR SUSPENSION INTRAMUSCULAR; INTRAVENOUS EVERY 6 HOURS
Refills: 0 | Status: DISCONTINUED | OUTPATIENT
Start: 2021-05-21 | End: 2021-05-24

## 2021-05-21 RX ORDER — ACETAMINOPHEN 500 MG
650 TABLET ORAL EVERY 6 HOURS
Refills: 0 | Status: DISCONTINUED | OUTPATIENT
Start: 2021-05-21 | End: 2021-05-24

## 2021-05-21 RX ORDER — ONDANSETRON 8 MG/1
4 TABLET, FILM COATED ORAL ONCE
Refills: 0 | Status: COMPLETED | OUTPATIENT
Start: 2021-05-21 | End: 2021-05-21

## 2021-05-21 RX ADMIN — AMPICILLIN SODIUM AND SULBACTAM SODIUM 200 GRAM(S): 250; 125 INJECTION, POWDER, FOR SUSPENSION INTRAMUSCULAR; INTRAVENOUS at 14:04

## 2021-05-21 RX ADMIN — AMPICILLIN SODIUM AND SULBACTAM SODIUM 200 GRAM(S): 250; 125 INJECTION, POWDER, FOR SUSPENSION INTRAMUSCULAR; INTRAVENOUS at 19:48

## 2021-05-21 RX ADMIN — SODIUM CHLORIDE 1000 MILLILITER(S): 9 INJECTION INTRAMUSCULAR; INTRAVENOUS; SUBCUTANEOUS at 10:26

## 2021-05-21 NOTE — ED ADULT NURSE REASSESSMENT NOTE - NS ED NURSE REASSESS COMMENT FT1
Report received from Mary LOU. Pt resting comfortably without complaints & does not appear to be in any acute distress at this time with VSS. Report received from Mary LOU. Pt resting comfortably without complaints & does not appear to be in any acute distress at this time with VSS. Pt aware that she will be staying until the AM to receive 2 more doses of IV abx & for reassessment.

## 2021-05-21 NOTE — CONSULT NOTE ADULT - SUBJECTIVE AND OBJECTIVE BOX
Verified Results  URINE CULTURE [id number] 03:30PM ALBINA SNOW   [Sep 12, 2018 2:15PM ALBINA SNOW]  Urine culture is negative     Test Name Result Flag Reference   URINE CULTURE  O    SPECIMEN DESCRIPTION (SDES): URINE, CLEAN CATCH/MIDSTREAM  CULTURE (CULT):        NO GROWTH.  REPORT STATUS (RPT):     FINAL 09/12/2018        [facility], [place] General Surgery Consultation     Patient is a 41y old  Female who presents with a chief complaint of breast pain    HPI:  Ms. Glass is a 41 year old woman without PMH except for known cystic lesions of the bilateral breast who presents with breast pain and erythema. She noted the pain start 1-2 days ago under the left nipple. It felt painful for a day or so without any fever or chills or erythema. She then noticed this morning an erythema under the left breast that remained stable throughout the day. She reported a fever of 101 but has remained afebrile in the ED. She has never had any erythema such as this before and denies any instances of nipple discharge or bleeding. She has known polycystic breast tissue for which she underwent Mammogram and US in November which demonstrated BIRADS II and 1yr follow up was recommended. She reports her last MP was 3 weeks ago, she is not sexually active, and not on birth control.  In the ED the patient has been afebrile, normotensive, and normocardic with a leukocytosis to 11.4. US demonstrated hyperemia and edema in the area of erythema overlying two cystic structures.    PAST MEDICAL & SURGICAL HISTORY:  Cystic breast lesions as noted above    FAMILY HISTORY:  Aunt with breast cacner    SOCIAL HISTORY:  Does not smoke or drink alcohol  Not sexually active, not on birth control at this time      Allergies    No Known Allergies    Intolerances        Vital Signs Last 24 Hrs  T(C): 36.8 (21 May 2021 15:36), Max: 36.8 (21 May 2021 15:36)  T(F): 98.2 (21 May 2021 15:36), Max: 98.2 (21 May 2021 15:36)  HR: 92 (21 May 2021 15:36) (92 - 100)  BP: 103/73 (21 May 2021 15:36) (103/73 - 133/90)  BP(mean): --  RR: 20 (21 May 2021 15:36) (16 - 20)  SpO2: 100% (21 May 2021 15:36) (98% - 100%)  Daily Height in cm: 157.48 (21 May 2021 08:59)    Daily     General: NAD, well-nourished  HEENT: Atraumatic, EOMI  Resp: Breathing comfortably on RA  Breast: R- multiple cystic nontender mobile masses appreciated without overlying skin erythema or drainage from the nipple. No axillary masses noted  L again multiple mobile nontender masses, largest 11:00 2cm from nipple as well as minimally tender blanching erythema over the inferior aspect of the breast below the nipple line, no draniage from the nipple or opening. Small mobile masses underneath  CV: Normal sinus rhythm  Abd: soft, nontender, nondistended  Ext: ROMIx4, motor strength intact x 4                          13.7   11.43 )-----------( 240      ( 21 May 2021 10:57 )             42.5     05-21    137  |  98  |  14  ----------------------------<  84  3.7   |  23  |  0.89    Ca    10.1      21 May 2021 10:57    TPro  8.4<H>  /  Alb  4.8  /  TBili  0.6  /  DBili  x   /  AST  12  /  ALT  10  /  AlkPhos  52  05-21    PT/INR - ( 21 May 2021 10:57 )   PT: 13.1 sec;   INR: 1.10 ratio         PTT - ( 21 May 2021 10:57 )  PTT:33.3 sec      Radiographic Findings:   < from: US Breast Limited, Left (05.21.21 @ 12:39) >    INTERPRETATION:  Clinical information: 41-year-old with left breast swelling, erythema and fever. Assess for abscess    Focused ultrasound examination of the left breast was performed at the 6:00 position in the region of swelling.    2 focal cystic lesions are seen in the area of swelling measuring 10 x 8 x 14 mm at the 6:00 position and 9 x 5 x 8 mm at the 5-6:00 position. Both cystic lesions demonstrate mural irregularity and internal echoes.    There is mild edematous change and hyperemia in the region.    Impression: Mild edema and hyperemia in the region of swelling at the 6:00 position.  2 focal cystic lesions are identified without evidence of focal collection.    < end of copied text >      Assessment:   Ms. Glass is a 41 year old woman without PMH except for known cystic lesions of the bilateral breast who presents with breast pain and erythema. At this time favor diagnosis of skin cellulitis over two cystic structures not involved as abscess. Discussed at length with Dr. Ordoñez and the patient plan is to trial PO Augmentin and follow up as an outpatient.    Plan for 1 week Augmentin 875  Follow up with Dr. Carmen Ordoñez on Tuesday  Please call [phone] to make appointment  Advised to re-present if patient develops worsening fever/chills, erythema not responding to abx.

## 2021-05-21 NOTE — ED PROVIDER NOTE - PROGRESS NOTE DETAILS
Vinayak Mckeon, PGY 3: refused pain medication. Dr. Lanza Note: will likely observe patient for breast surgeon opinion of breast infection with cystic lesions and likely IV antibiotics with transition to oral

## 2021-05-21 NOTE — ED PROVIDER NOTE - OBJECTIVE STATEMENT
h.o breast cyst, p.w left breast pain and fevers for 2 days. 101 fever. no breast feeding, no trauma to the breast. no shortness of breath, chest pain, abdominal pain, diarrhea. patient follows with her doctor regularly for breast cyst w. u/s and mammogram. mild nausea. LMP 2 wks ago

## 2021-05-21 NOTE — ED PROVIDER NOTE - ATTENDING CONTRIBUTION TO CARE
Pt with gradual onset of 3d of LEFT inferior portion breast pain associtated with tenderness, fever to 101F, and now with 1d of reddness of skin.  No priors like this.  No recent meds or steroid use.  Denies trauma or breastfeeding or sexual activity involving breast recently.  Exam with chaperone Lisa: nipple normal appearance, no discharge, no axillary LNA, L inferior breast portion with erythema c/w inflammation, no dimpling, +td of inferior portion of breast, unable to discern a discrete collection. Highly suspicious for breast abscess, also concern given reported h/o breast cyst for possible malignancy.  Start with u/s breast, labs, defer antibiotics for possible future needle aspiration culture.

## 2021-05-21 NOTE — ED PROVIDER NOTE - CLINICAL SUMMARY MEDICAL DECISION MAKING FREE TEXT BOX
fever with breast swelling and pain for 2 days, no breastfeeding or trauma. mild erythema and ttp on inferior breast, eval for breast abscess vs cellulitis. no chest pain or shortness of breath. h.o breast cyst

## 2021-05-21 NOTE — ED CDU PROVIDER INITIAL DAY NOTE - PROGRESS NOTE DETAILS
Pt comfortable. No new complaints. Vital signs stable. Will continue to observe and reassess. -Zayda Saxena PA-C Patient seen at bedside with RN Deborah. CHLOES. Patient resting comfortably, no complaints. L breast with erythema within demarcated markings. Area mildly tender to palpation. Patient evaluated by surgery team, per note, patient can follow up outpatient and be discharged with PO antibiotics. Patient was evaluated by Dr. Christianson, who recommended patient stay overnight for continued monitoring and IV antibiotics, as it does not appear that erythema has lessened. At this time patient is receiving 2nd dose of IV antibiotics.    Will reevaluate.

## 2021-05-21 NOTE — ED ADULT NURSE NOTE - OBJECTIVE STATEMENT
42 yo F with PMHx of breast cysts presents to ED c/o left sided breast tenderness and inflammation x 2 days and fever yesterday TMAX 101.5 F. pt is not currently breast feeding and her LMP was 5/6. pt has tried nothing for relief.  pt's left breast is firm and warm to the touch with no streaking or discharge. pt is A&Ox4, MAEW.  pt denies: NVD, abd pain, urinary symptoms, chest pain, SOB, HA, vision changes, generalized or focal weakness, numbness or tingling. 40 yo F with PMHx of benign breast cysts never I&D presents to ED c/o left sided breast tenderness and redness x 2 days and fever yesterday TMAX 101.5 F. pt is not currently breast feeding and her LMP was 5/6. pt has tried nothing for relief. pt endorses minimal nausea and mild tenderness only to touch.  pt's left breast is firm and warm to the touch with minimal erythema, no streaking or discharge. pt is A&Ox4, MAEW.  pt denies: V/D, abd pain, urinary symptoms, chest pain, SOB, HA, vision changes, generalized or focal weakness, numbness or tingling.

## 2021-05-21 NOTE — ED PROVIDER NOTE - PHYSICAL EXAMINATION
General: NAD, good hygiene, well developed  HENT: Atraumatic, EOMI, no conjunctivae injection, moist mucosa.  Neck: normal ROM and trachea midline   Cardiovascular: tachycardiac, erythema and ttp to the left inferior breast, no gross LAD, no nipple discharge or retractions, radial pulses equal and b/l  Respiratory: CTABL, no wheezes or crackles, no decreased breath sounds  Abdominal: soft and non-tender non distended, neg for guarding, no CVA tenderness   Extremities: no edema of the legs/feet  Skin: warm, well perfused  Neurologic: nonfocal, AAOx3  Psych: normal mood and affect

## 2021-05-22 VITALS
HEART RATE: 83 BPM | DIASTOLIC BLOOD PRESSURE: 74 MMHG | RESPIRATION RATE: 16 BRPM | SYSTOLIC BLOOD PRESSURE: 111 MMHG | TEMPERATURE: 98 F | OXYGEN SATURATION: 97 %

## 2021-05-22 LAB
HCT VFR BLD CALC: 34.5 % — SIGNIFICANT CHANGE UP (ref 34.5–45)
HGB BLD-MCNC: 11.1 G/DL — LOW (ref 11.5–15.5)
MCHC RBC-ENTMCNC: 29.4 PG — SIGNIFICANT CHANGE UP (ref 27–34)
MCHC RBC-ENTMCNC: 32.2 GM/DL — SIGNIFICANT CHANGE UP (ref 32–36)
MCV RBC AUTO: 91.5 FL — SIGNIFICANT CHANGE UP (ref 80–100)
NRBC # BLD: 0 /100 WBCS — SIGNIFICANT CHANGE UP (ref 0–0)
PLATELET # BLD AUTO: 217 K/UL — SIGNIFICANT CHANGE UP (ref 150–400)
RBC # BLD: 3.77 M/UL — LOW (ref 3.8–5.2)
RBC # FLD: 12.7 % — SIGNIFICANT CHANGE UP (ref 10.3–14.5)
WBC # BLD: 11.24 K/UL — HIGH (ref 3.8–10.5)
WBC # FLD AUTO: 11.24 K/UL — HIGH (ref 3.8–10.5)

## 2021-05-22 PROCEDURE — 83605 ASSAY OF LACTIC ACID: CPT

## 2021-05-22 PROCEDURE — 85610 PROTHROMBIN TIME: CPT

## 2021-05-22 PROCEDURE — 76642 ULTRASOUND BREAST LIMITED: CPT

## 2021-05-22 PROCEDURE — G0378: CPT

## 2021-05-22 PROCEDURE — 96374 THER/PROPH/DIAG INJ IV PUSH: CPT

## 2021-05-22 PROCEDURE — 99284 EMERGENCY DEPT VISIT MOD MDM: CPT | Mod: 25

## 2021-05-22 PROCEDURE — 85027 COMPLETE CBC AUTOMATED: CPT

## 2021-05-22 PROCEDURE — 99217: CPT

## 2021-05-22 PROCEDURE — 87040 BLOOD CULTURE FOR BACTERIA: CPT

## 2021-05-22 PROCEDURE — U0005: CPT

## 2021-05-22 PROCEDURE — U0003: CPT

## 2021-05-22 PROCEDURE — 84145 PROCALCITONIN (PCT): CPT

## 2021-05-22 PROCEDURE — 85730 THROMBOPLASTIN TIME PARTIAL: CPT

## 2021-05-22 PROCEDURE — 96376 TX/PRO/DX INJ SAME DRUG ADON: CPT

## 2021-05-22 PROCEDURE — 85025 COMPLETE CBC W/AUTO DIFF WBC: CPT

## 2021-05-22 PROCEDURE — 80053 COMPREHEN METABOLIC PANEL: CPT

## 2021-05-22 RX ADMIN — AMPICILLIN SODIUM AND SULBACTAM SODIUM 200 GRAM(S): 250; 125 INJECTION, POWDER, FOR SUSPENSION INTRAMUSCULAR; INTRAVENOUS at 07:43

## 2021-05-22 RX ADMIN — AMPICILLIN SODIUM AND SULBACTAM SODIUM 200 GRAM(S): 250; 125 INJECTION, POWDER, FOR SUSPENSION INTRAMUSCULAR; INTRAVENOUS at 02:02

## 2021-05-22 NOTE — ED CDU PROVIDER SUBSEQUENT DAY NOTE - PHYSICAL EXAMINATION
General: NAD, good hygiene, well developed  	HENT: Atraumatic, eyes clear  	Neck: normal ROM and trachea midline   Cards: RRR  	Skin: Erythema and ttp to the left inferior breast, no gross LAD, no nipple discharge or retractions  	Respiratory: CTABL, no wheezes or crackles, no decreased breath sounds  	Abdominal: soft and non-tender non distended  	Extremities: no edema of the legs/feet  	Neurologic: AAOx3, no gross deficits   Psych: normal mood and affect

## 2021-05-22 NOTE — ED CDU PROVIDER SUBSEQUENT DAY NOTE - HISTORY
No interval changes since initial CDU provider note. Pt feels well without complaint. NAD VSS.  Patient seen at bedside with CARMINE Cabrera.  L breast with erythema receding from demarcated markings, erythema appears to be improved. Area mildly tender to palpation. Plan to continue antibiotics in the setting of breast cellulitis. - CANDIDA Chaney

## 2021-05-22 NOTE — ED CDU PROVIDER SUBSEQUENT DAY NOTE - PROGRESS NOTE DETAILS
PO temp 100.3F, HR 98. Offered Tylenol, patient declined. L breast with erythema receding from demarcated markings, erythema appears to be improved. Area mildly tender to palpation. Will reevaluate. - Rita Chaney PA-C CDU NOTE CANDIDA Greenwood: pt resting comfortably, feels well without complaint. NAD VSS. L breast erythema- within boundary previously drawn and improving. area is tender, +induration. no area of flucutance. no nipple discharge.   warm packs given to put on area. CDU NOTE CANDIDA Greenwood: as per Dr. Felton, pt stable for d/c home on oral abx and f/up outpt with Surgeon Dr. Ordoñez.

## 2021-05-22 NOTE — ED CDU PROVIDER DISPOSITION NOTE - NSFOLLOWUPINSTRUCTIONS_ED_ALL_ED_FT
1. Rest. Stay hydrated.   2. Continue your current home medications. Please take Augmentin 875 twice a day for 1 week.   3. Follow-up with your medical doctor in 2-3 days.  Bring your results with you for follow-up.  4. Return to the ER if you have any new or worsening symptoms, chest pain, spreading of redness, difficulty breathing, fevers, chills, weakness, or any other concerns.  5. Follow up with Dr. Carmen Ordoñez on Tuesday  Please call [phone] to make appointment 1. Rest. Stay hydrated. warm compresses to area frequently throughout the day.  2. Continue your current home medications. Please take Augmentin 875 twice a day for 1 week.   3. Follow-up with your medical doctor in 2-3 days.  Bring your results with you for follow-up.  4. Return to the ER if you have any new or worsening symptoms, chest pain, spreading of redness, difficulty breathing, fevers, chills, weakness, or any other concerns.  5. Follow up with Dr. Carmen Ordoñez on Tuesday  Please call [phone] to make appointment        Cellulitis, Adult       Cellulitis is a skin infection. The infected area is usually warm, red, swollen, and tender. This condition occurs most often in the arms and lower legs. The infection can travel to the muscles, blood, and underlying tissue and become serious. It is very important to get treated for this condition.      What are the causes?    Cellulitis is caused by bacteria. The bacteria enter through a break in the skin, such as a cut, burn, insect bite, open sore, or crack.      What increases the risk?  This condition is more likely to occur in people who:  •Have a weak body defense system (immune system).      •Have open wounds on the skin, such as cuts, burns, bites, and scrapes. Bacteria can enter the body through these open wounds.      •Are older than 60 years of age.      •Have diabetes.      •Have a type of long-lasting (chronic) liver disease (cirrhosis) or kidney disease.      •Are obese.    •Have a skin condition such as:  •Itchy rash (eczema).      •Slow movement of blood in the veins (venous stasis).      •Fluid buildup below the skin (edema).        •Have had radiation therapy.      •Use IV drugs.        What are the signs or symptoms?  Symptoms of this condition include:  •Redness, streaking, or spotting on the skin.      •Swollen area of the skin.      •Tenderness or pain when an area of the skin is touched.      •Warm skin.      •A fever.      •Chills.      •Blisters.        How is this diagnosed?  This condition is diagnosed based on a medical history and physical exam. You may also have tests, including:  •Blood tests.      •Imaging tests.        How is this treated?  Treatment for this condition may include:  •Medicines, such as antibiotic medicines or medicines to treat allergies (antihistamines).      •Supportive care, such as rest and application of cold or warm cloths (compresses) to the skin.      •Hospital care, if the condition is severe.      The infection usually starts to get better within 1–2 days of treatment.      Follow these instructions at home:     Medicines     •Take over-the-counter and prescription medicines only as told by your health care provider.      •If you were prescribed an antibiotic medicine, take it as told by your health care provider. Do not stop taking the antibiotic even if you start to feel better.      General instructions     •Drink enough fluid to keep your urine pale yellow.      • Do not touch or rub the infected area.      •Raise (elevate) the infected area above the level of your heart while you are sitting or lying down.      •Apply warm or cold compresses to the affected area as told by your health care provider.      •Keep all follow-up visits as told by your health care provider. This is important. These visits let your health care provider make sure a more serious infection is not developing.        Contact a health care provider if:    •You have a fever.      •Your symptoms do not begin to improve within 1–2 days of starting treatment.      •Your bone or joint underneath the infected area becomes painful after the skin has healed.      •Your infection returns in the same area or another area.      •You notice a swollen bump in the infected area.      •You develop new symptoms.      •You have a general ill feeling (malaise) with muscle aches and pains.        Get help right away if:    •Your symptoms get worse.      •You feel very sleepy.      •You develop vomiting or diarrhea that persists.      •You notice red streaks coming from the infected area.      •Your red area gets larger or turns dark in color.      These symptoms may represent a serious problem that is an emergency. Do not wait to see if the symptoms will go away. Get medical help right away. Call your local emergency services (911 in the [place] Do not drive yourself to the hospital.       Summary    •Cellulitis is a skin infection. This condition occurs most often in the arms and lower legs.      •Treatment for this condition may include medicines, such as antibiotic medicines or antihistamines.      •Take over-the-counter and prescription medicines only as told by your health care provider. If you were prescribed an antibiotic medicine, do not stop taking the antibiotic even if you start to feel better.      •Contact a health care provider if your symptoms do not begin to improve within 1–2 days of starting treatment or your symptoms get worse.      •Keep all follow-up visits as told by your health care provider. This is important. These visits let your health care provider make sure that a more serious infection is not developing.      This information is not intended to replace advice given to you by your health care provider. Make sure you discuss any questions you have with your health care provider.      Document Revised: 12/28/2020 Document Reviewed: 05/09/2019    Elsevier Patient Education © 2021 [employer] 1. Rest. Stay hydrated. warm compresses to area frequently throughout the day.  2. Continue your current home medications. Please take Augmentin 875 twice a day for 1 week.   3. Follow-up with your medical doctor in 2-3 days.  Bring your results with you for follow-up.  4. Return to the ER if you have any new or worsening symptoms, chest pain, spreading of redness, difficulty breathing, fevers, chills, weakness, or any other concerns.  5. Follow up with Breast Surgeon Dr. Carmen Ordoñez on Tuesday  Please call [phone] to make appointment        Cellulitis, Adult       Cellulitis is a skin infection. The infected area is usually warm, red, swollen, and tender. This condition occurs most often in the arms and lower legs. The infection can travel to the muscles, blood, and underlying tissue and become serious. It is very important to get treated for this condition.      What are the causes?    Cellulitis is caused by bacteria. The bacteria enter through a break in the skin, such as a cut, burn, insect bite, open sore, or crack.      What increases the risk?  This condition is more likely to occur in people who:  •Have a weak body defense system (immune system).      •Have open wounds on the skin, such as cuts, burns, bites, and scrapes. Bacteria can enter the body through these open wounds.      •Are older than 60 years of age.      •Have diabetes.      •Have a type of long-lasting (chronic) liver disease (cirrhosis) or kidney disease.      •Are obese.    •Have a skin condition such as:  •Itchy rash (eczema).      •Slow movement of blood in the veins (venous stasis).      •Fluid buildup below the skin (edema).        •Have had radiation therapy.      •Use IV drugs.        What are the signs or symptoms?  Symptoms of this condition include:  •Redness, streaking, or spotting on the skin.      •Swollen area of the skin.      •Tenderness or pain when an area of the skin is touched.      •Warm skin.      •A fever.      •Chills.      •Blisters.        How is this diagnosed?  This condition is diagnosed based on a medical history and physical exam. You may also have tests, including:  •Blood tests.      •Imaging tests.        How is this treated?  Treatment for this condition may include:  •Medicines, such as antibiotic medicines or medicines to treat allergies (antihistamines).      •Supportive care, such as rest and application of cold or warm cloths (compresses) to the skin.      •Hospital care, if the condition is severe.      The infection usually starts to get better within 1–2 days of treatment.      Follow these instructions at home:     Medicines     •Take over-the-counter and prescription medicines only as told by your health care provider.      •If you were prescribed an antibiotic medicine, take it as told by your health care provider. Do not stop taking the antibiotic even if you start to feel better.      General instructions     •Drink enough fluid to keep your urine pale yellow.      • Do not touch or rub the infected area.      •Raise (elevate) the infected area above the level of your heart while you are sitting or lying down.      •Apply warm or cold compresses to the affected area as told by your health care provider.      •Keep all follow-up visits as told by your health care provider. This is important. These visits let your health care provider make sure a more serious infection is not developing.        Contact a health care provider if:    •You have a fever.      •Your symptoms do not begin to improve within 1–2 days of starting treatment.      •Your bone or joint underneath the infected area becomes painful after the skin has healed.      •Your infection returns in the same area or another area.      •You notice a swollen bump in the infected area.      •You develop new symptoms.      •You have a general ill feeling (malaise) with muscle aches and pains.        Get help right away if:    •Your symptoms get worse.      •You feel very sleepy.      •You develop vomiting or diarrhea that persists.      •You notice red streaks coming from the infected area.      •Your red area gets larger or turns dark in color.      These symptoms may represent a serious problem that is an emergency. Do not wait to see if the symptoms will go away. Get medical help right away. Call your local emergency services (911 in the [place] Do not drive yourself to the hospital.       Summary    •Cellulitis is a skin infection. This condition occurs most often in the arms and lower legs.      •Treatment for this condition may include medicines, such as antibiotic medicines or antihistamines.      •Take over-the-counter and prescription medicines only as told by your health care provider. If you were prescribed an antibiotic medicine, do not stop taking the antibiotic even if you start to feel better.      •Contact a health care provider if your symptoms do not begin to improve within 1–2 days of starting treatment or your symptoms get worse.      •Keep all follow-up visits as told by your health care provider. This is important. These visits let your health care provider make sure that a more serious infection is not developing.      This information is not intended to replace advice given to you by your health care provider. Make sure you discuss any questions you have with your health care provider.      Document Revised: 12/28/2020 Document Reviewed: 05/09/2019    Elsevier Patient Education © 2021 [employer]

## 2021-05-22 NOTE — ED CDU PROVIDER SUBSEQUENT DAY NOTE - NS ED ROS FT
Constitutional: + fever   Eyes: No visual changes  CV: No chest pain or lower extremity edema  Resp: No SOB no cough  GI: No abd pain. No nausea or vomiting. No diarrhea.   : No dysuria, hematuria.   MSK: No musculoskeletal pain  Skin: + rash/pain to breast   Psych: No complaints   Neuro: No headache. No numbness or tingling.

## 2021-05-22 NOTE — ED CDU PROVIDER SUBSEQUENT DAY NOTE - MEDICAL DECISION MAKING DETAILS
Faye Felton MD  presents with left breast cellulitis, had an U/S that showed cellulitis and no distinctive phlegmon, admitted tot he CDU overnight for IV clinda, and close observation.

## 2021-05-22 NOTE — ED CDU PROVIDER DISPOSITION NOTE - CARE PROVIDER_API CALL
Carmen Ordoñez)  Surgery  [address] [place] [address]  Phone: [phone]  Fax: [phone]  Follow Up Time:

## 2021-05-22 NOTE — ED ADULT NURSE REASSESSMENT NOTE - NS ED NURSE REASSESS COMMENT FT1
Pt resting comfortably without complaints & does not appear to be in any acute distress at this time. Pt's states she feels hot, oral temperature 100.3, but declines PRN Tylenol at this time. Lt breast demarcation of red area receding & redness fading to pink, area remains warm to touch. Shiv TIRADO aware, recommends drawing CBC at 5 or 6 AM.

## 2021-05-22 NOTE — ED ADULT NURSE REASSESSMENT NOTE - NS ED NURSE REASSESS COMMENT FT1
Pt resting comfortably without complaints & does not appear to be in any acute distress at this time with VSS.

## 2021-05-22 NOTE — ED CDU PROVIDER DISPOSITION NOTE - PATIENT PORTAL LINK FT
You can access the FollowMyHealth Patient Portal offered by [facility] by registering at the following website: [email or web address]. By joining [facility]’s FollowMyHealth portal, you will also be able to view your health information using other applications (apps) compatible with our system.

## 2021-05-22 NOTE — ED CDU PROVIDER DISPOSITION NOTE - CLINICAL COURSE
The patient is a 41y Female complaining of fever. h.o breast cyst, p.w left breast pain and fevers for 2 days. 101 fever. no breast feeding, no trauma to the breast. no shortness of breath, chest pain, abdominal pain, diarrhea. patient follows with her doctor regularly for breast cyst w. u/s and mammogram. mild nausea. LMP 2 wks ago  ED course: WBC 11.43, US Breast "Mild edema and hyperemia in the region of swelling at the 6:00 position. 2 focal cystic lesions are identified without evidence of focal collection." Started on Unasyn. Seen by breast surgery, who recommended PO antibiotics and outpatient followup. The patient is a 41y Female complaining of fever. h.o breast cyst, p.w left breast pain and fevers for 2 days. 101 fever. no breast feeding, no trauma to the breast. no shortness of breath, chest pain, abdominal pain, diarrhea. patient follows with her doctor regularly for breast cyst w. u/s and mammogram. mild nausea. LMP 2 wks ago  ED course: WBC 11.43, US Breast "Mild edema and hyperemia in the region of swelling at the 6:00 position. 2 focal cystic lesions are identified without evidence of focal collection." Started on Unasyn. Seen by breast surgery, who recommended PO antibiotics and outpatient followup.  pt did well in cdu, tolerating The patient is a 41y Female complaining of fever. h.o breast cyst, p.w left breast pain and fevers for 2 days. 101 fever. no breast feeding, no trauma to the breast. no shortness of breath, chest pain, abdominal pain, diarrhea. patient follows with her doctor regularly for breast cyst w. u/s and mammogram. mild nausea. LMP 2 wks ago  ED course: WBC 11.43, US Breast "Mild edema and hyperemia in the region of swelling at the 6:00 position. 2 focal cystic lesions are identified without evidence of focal collection." Started on Unasyn. Seen by breast surgery, who recommended PO antibiotics and outpatient followup.  pt did well in cdu, tolerating abx fine, responding well, area is improving, surgery cleared to f/up outpt, pt stable for d/c home.

## 2021-05-22 NOTE — ED CDU PROVIDER SUBSEQUENT DAY NOTE - ATTENDING CONTRIBUTION TO CARE
I performed a history and physical exam of the patient and discussed their management with the ACP. I reviewed the ACP's note and agree with the documented findings and plan of care.  Faye Felton MD

## 2021-05-24 ENCOUNTER — TRANSCRIPTION ENCOUNTER (OUTPATIENT)
Age: 41
End: 2021-05-24

## 2021-05-24 PROBLEM — Z78.9 OTHER SPECIFIED HEALTH STATUS: Chronic | Status: ACTIVE | Noted: 2021-05-21

## 2021-05-25 PROBLEM — Z11.3 SCREENING FOR STD (SEXUALLY TRANSMITTED DISEASE): Status: RESOLVED | Noted: 2018-08-23 | Resolved: 2021-05-25

## 2021-05-25 PROBLEM — Z20.9 EXPOSURE TO COMMUNICABLE DISEASE: Status: RESOLVED | Noted: 2018-08-23 | Resolved: 2021-05-25

## 2021-05-25 PROBLEM — Z32.00 ENCOUNTER FOR PREGNANCY TEST: Status: RESOLVED | Noted: 2019-05-08 | Resolved: 2021-05-25

## 2021-05-25 PROBLEM — Z23 ENCOUNTER FOR IMMUNIZATION: Status: RESOLVED | Noted: 2018-08-23 | Resolved: 2021-05-25

## 2021-05-25 PROBLEM — Z12.39 BREAST CANCER SCREENING: Status: RESOLVED | Noted: 2020-07-09 | Resolved: 2021-05-25

## 2021-05-25 PROBLEM — Z00.00 ANNUAL PHYSICAL EXAM: Status: RESOLVED | Noted: 2018-08-23 | Resolved: 2021-05-25

## 2021-05-26 ENCOUNTER — NON-APPOINTMENT (OUTPATIENT)
Age: 41
End: 2021-05-26

## 2021-05-26 ENCOUNTER — APPOINTMENT (OUTPATIENT)
Dept: INTERNAL MEDICINE | Facility: CLINIC | Age: 41
End: 2021-05-26
Payer: COMMERCIAL

## 2021-05-26 ENCOUNTER — APPOINTMENT (OUTPATIENT)
Dept: SURGICAL ONCOLOGY | Facility: CLINIC | Age: 41
End: 2021-05-26
Payer: COMMERCIAL

## 2021-05-26 VITALS
HEART RATE: 95 BPM | TEMPERATURE: 98 F | OXYGEN SATURATION: 99 % | RESPIRATION RATE: 14 BRPM | DIASTOLIC BLOOD PRESSURE: 70 MMHG | SYSTOLIC BLOOD PRESSURE: 110 MMHG | BODY MASS INDEX: 23.19 KG/M2 | WEIGHT: 126 LBS | HEIGHT: 62 IN

## 2021-05-26 DIAGNOSIS — Z00.00 ENCOUNTER FOR GENERAL ADULT MEDICAL EXAMINATION W/OUT ABNORMAL FINDINGS: ICD-10-CM

## 2021-05-26 DIAGNOSIS — K62.5 HEMORRHAGE OF ANUS AND RECTUM: ICD-10-CM

## 2021-05-26 DIAGNOSIS — S93.402A SPRAIN OF UNSPECIFIED LIGAMENT OF LEFT ANKLE, INITIAL ENCOUNTER: ICD-10-CM

## 2021-05-26 DIAGNOSIS — Z80.3 FAMILY HISTORY OF MALIGNANT NEOPLASM OF BREAST: ICD-10-CM

## 2021-05-26 DIAGNOSIS — M25.572 PAIN IN LEFT ANKLE AND JOINTS OF LEFT FOOT: ICD-10-CM

## 2021-05-26 DIAGNOSIS — Z20.9 CONTACT WITH AND (SUSPECTED) EXPOSURE TO UNSPECIFIED COMMUNICABLE DISEASE: ICD-10-CM

## 2021-05-26 DIAGNOSIS — Z12.39 ENCOUNTER FOR OTHER SCREENING FOR MALIGNANT NEOPLASM OF BREAST: ICD-10-CM

## 2021-05-26 DIAGNOSIS — Z32.00 ENCOUNTER FOR PREGNANCY TEST, RESULT UNKNOWN: ICD-10-CM

## 2021-05-26 DIAGNOSIS — Z80.42 FAMILY HISTORY OF MALIGNANT NEOPLASM OF PROSTATE: ICD-10-CM

## 2021-05-26 DIAGNOSIS — Z23 ENCOUNTER FOR IMMUNIZATION: ICD-10-CM

## 2021-05-26 DIAGNOSIS — Z11.3 ENCOUNTER FOR SCREENING FOR INFECTIONS WITH A PREDOMINANTLY SEXUAL MODE OF TRANSMISSION: ICD-10-CM

## 2021-05-26 DIAGNOSIS — Z86.19 PERSONAL HISTORY OF OTHER INFECTIOUS AND PARASITIC DISEASES: ICD-10-CM

## 2021-05-26 DIAGNOSIS — Z80.0 FAMILY HISTORY OF MALIGNANT NEOPLASM OF DIGESTIVE ORGANS: ICD-10-CM

## 2021-05-26 LAB
CULTURE RESULTS: SIGNIFICANT CHANGE UP
CULTURE RESULTS: SIGNIFICANT CHANGE UP
CYTOLOGY CVX/VAG DOC THIN PREP: NORMAL
SPECIMEN SOURCE: SIGNIFICANT CHANGE UP
SPECIMEN SOURCE: SIGNIFICANT CHANGE UP

## 2021-05-26 PROCEDURE — 36415 COLL VENOUS BLD VENIPUNCTURE: CPT

## 2021-05-26 PROCEDURE — 99396 PREV VISIT EST AGE 40-64: CPT | Mod: 25

## 2021-05-26 PROCEDURE — 99203 OFFICE O/P NEW LOW 30 MIN: CPT

## 2021-05-26 PROCEDURE — 99072 ADDL SUPL MATRL&STAF TM PHE: CPT

## 2021-05-26 PROCEDURE — 93000 ELECTROCARDIOGRAM COMPLETE: CPT

## 2021-05-26 NOTE — HISTORY OF PRESENT ILLNESS
[FreeTextEntry1] : Ms. Glass is a 41 year old female with known history of cystic lesions of the bilateral breasts who presents for followup of Left breast cellulitis. \par \par The patient noted the pain started last week under the left nipple. It felt painful for a day or so without any fever or chills or erythema. She then noticed erythema under the left breast and reported a fever of 101 at home. She went to the ED on 5/21/21 where she was evaluated. She was afebrile, WBC 11.4. US demonstrated hyperemia and edema in the area of erythema overlying two cystic structures, no abscess. She was started on Unasyn, monitored in the CDU, and discharged with Augmentin. Currently she feels that it is mostly resolved, but not completely back to normal yet. She still has 2.5 days of Augmentin left.\par \par She has never had any erythema such as this before and denies any instances of nipple discharge or bleeding. \par \par Most recent imaging:\par 11/09/2020 B/L DM ([facility]) showed extremely dense breasts\par  - R multiple scattered cysts up to 3.7 cm\par  - L multiple scattered cysts up to 5.1 cm \par  - BR2 \par \par PAST MEDICAL & SURGICAL HISTORY:\par Cystic breast lesions as noted above\par \par FAMILY HISTORY:\par Mat aunts with breast cancer x 2, age 70s (2/10 maternal aunts); Mat cousin BC age 42 (GT negative). Father metastatic prostate cancer (GT negative), paternal aunt colon cancer 40s.\par \par SOCIAL HISTORY:\par Does not smoke or drink alcohol\par Not sexually active, not on birth control at this time\par \par ALL: Advil- hives\par \par \par Radiographic Findings: \par < from: US Breast Limited, Left (05.21.21 @ 12:39) >\par \par INTERPRETATION:  Clinical information: 41-year-old with left breast swelling, erythema and fever. Assess for abscess\par \par Focused ultrasound examination of the left breast was performed at the 6:00 position in the region of swelling.\par \par 2 focal cystic lesions are seen in the area of swelling measuring 10 x 8 x 14 mm at the 6:00 position and 9 x 5 x 8 mm at the 5-6:00 position. Both cystic lesions demonstrate mural irregularity and internal echoes.\par \par There is mild edematous change and hyperemia in the region.\par \par Impression: Mild edema and hyperemia in the region of swelling at the 6:00 position.\par 2 focal cystic lesions are identified without evidence of focal collection.\par \par < end of copied text >\par \par

## 2021-05-26 NOTE — PHYSICAL EXAM
[Normocephalic] : normocephalic [Atraumatic] : atraumatic [EOMI] : extra ocular movement intact [PERRL] : pupils equal, round and reactive to light [Sclera nonicteric] : sclera nonicteric [Supple] : supple [No Supraclavicular Adenopathy] : no supraclavicular adenopathy [No Cervical Adenopathy] : no cervical adenopathy [Examined in the supine and seated position] : examined in the supine and seated position [Asymmetrical] : asymmetrical [Bra Size: ___] : Bra Size: [unfilled] [None] : no ptosis [Breast Nipple Inversion] : nipples not inverted [No Axillary Lymphadenopathy] : no left axillary lymphadenopathy [No Edema] : no edema [No Swelling] : no swelling [Full ROM] : full range of motion [de-identified] : non-labored respirations  [de-identified] : Left breast larger than Right [de-identified] : Numerous breast masses throughout breast; dominant mass 4 cm noted laterally [de-identified] : Numerous breast masses throughout breast; dominant mass 5 cm noted superiorly. Inferior breast with elongated mass noted 2 cm x 5 cm approximately with overlying hyperpigmentation, blanching. No induration. Skin intact.  [de-identified] : left nipple with cleft (pt unsure of chronicity, but does not think it is new)

## 2021-05-26 NOTE — PAST MEDICAL HISTORY
[Menstruating] : The patient is menstruating [Menarche Age ____] : age at menarche was [unfilled] [History of Hormone Replacement Treatment] : has no history of hormone replacement treatment [Definite ___ (Date)] : the last menstrual period was [unfilled] [Regular Cycle Intervals] : have been regular [Total Preg ___] : G[unfilled] [FreeTextEntry6] : contemplating egg freezing [FreeTextEntry7] : none [FreeTextEntry8] : n/a

## 2021-05-26 NOTE — HEALTH RISK ASSESSMENT
[0] : 2) Feeling down, depressed, or hopeless: Not at all (0) [Patient reported mammogram was normal] : Patient reported mammogram was normal [None] : None [With Family] : lives with family [Employed] : employed [Single] : single [] : No [de-identified] : social [[id number]] : 0 [Reports changes in hearing] : Reports no changes in hearing [Reports changes in vision] : Reports no changes in vision [Reports changes in dental health] : Reports no changes in dental health [MammogramDate] : 11/20 [HIVDate] : 05/21 [HIVComments] : negative [HepatitisCDate] : 05/21 [HepatitisCComments] : negative [FreeTextEntry2] :

## 2021-05-26 NOTE — PHYSICAL EXAM
[Well Nourished] : well nourished [Well Developed] : well developed [Well-Appearing] : well-appearing [Normal Sclera/Conjunctiva] : normal sclera/conjunctiva [PERRL] : pupils equal round and reactive to light [EOMI] : extraocular movements intact [Normal Outer Ear/Nose] : the outer ears and nose were normal in appearance [No JVD] : no jugular venous distention [No Lymphadenopathy] : no lymphadenopathy [Supple] : supple [Thyroid Normal, No Nodules] : the thyroid was normal and there were no nodules present [No Respiratory Distress] : no respiratory distress  [No Accessory Muscle Use] : no accessory muscle use [Clear to Auscultation] : lungs were clear to auscultation bilaterally [Normal Rate] : normal rate  [Regular Rhythm] : with a regular rhythm [Normal S1, S2] : normal S1 and S2 [No Murmur] : no murmur heard [No Carotid Bruits] : no carotid bruits [No Varicosities] : no varicosities [Pedal Pulses Present] : the pedal pulses are present [No Edema] : there was no peripheral edema [No Extremity Clubbing/Cyanosis] : no extremity clubbing/cyanosis [No Nipple Discharge] : no nipple discharge [No Axillary Lymphadenopathy] : no axillary lymphadenopathy [Soft] : abdomen soft [Non Tender] : non-tender [Non-distended] : non-distended [No Masses] : no abdominal mass palpated [No HSM] : no HSM [Normal Bowel Sounds] : normal bowel sounds [Normal Posterior Cervical Nodes] : no posterior cervical lymphadenopathy [Normal Anterior Cervical Nodes] : no anterior cervical lymphadenopathy [No CVA Tenderness] : no CVA  tenderness [No Spinal Tenderness] : no spinal tenderness [No Joint Swelling] : no joint swelling [Grossly Normal Strength/Tone] : grossly normal strength/tone [No Rash] : no rash [Coordination Grossly Intact] : coordination grossly intact [No Focal Deficits] : no focal deficits [Normal Gait] : normal gait [Deep Tendon Reflexes (DTR)] : deep tendon reflexes were 2+ and symmetric [Normal Affect] : the affect was normal [Alert and Oriented x3] : oriented to person, place, and time [Normal Insight/Judgement] : insight and judgment were intact [No Acute Distress] : no acute distress [de-identified] : left breat nodule at 1200, slightly red, firm area on underside of breast from 600-700

## 2021-05-26 NOTE — PLAN
[FreeTextEntry1] : Depression screening negative\par BP is controlled\par Check labs today, including CBC, CMP, TSH, HbA1c, lipids\par Continue exercise\par f/u with breast surgeon today\par MMR vaccine discussed today. Offered one more dose. She defers for today\par pap UTD\par f/u 1 year or prn\par

## 2021-05-26 NOTE — HISTORY OF PRESENT ILLNESS
[FreeTextEntry1] : Annual physical [de-identified] : 41-year-old female who is here for an annual physical.  She went to the emergency room last week for a left breast infection.  She was admitted and received IV antibiotics now she is on amoxicillin.  Pain and redness have improved.  She has follow-up with the breast surgeon today.\par She went to [facility] to evaluate her fertility.  Her labs indicate that she is not immune to measles.  In 2019 she received a booster MMR vaccine.  The titer was not rechecked.  \par 2 years ago at her physical the hepatitis C came back weakly positive. She followed up with GI who thought it was likely a false positive and told her to repeat it in "a couple of years" .  She had a repeat done with the reproductive endocrinologist and hepatitis C was negative.\par Her father has been in and out of the hospital. Therefore she has not been getting as much exercise as she usually does. \par pap done recently\par mammo 2011\par \par

## 2021-05-26 NOTE — CONSULT LETTER
[Dear  ___] : Dear  [unfilled], [Consult Letter:] : I had the pleasure of evaluating your patient, [unfilled]. [Please see my note below.] : Please see my note below. [Sincerely,] : Sincerely, [FreeTextEntry3] : Carmen Ordoñez MD\par Breast Surgeon\par Division of Surgical Oncology\par Department of Surgery\par [address] [place] [address]\[place] [address] \par Tel: [phone]\par Fax: [phone]\par Email: [email or web address]

## 2021-05-26 NOTE — ASSESSMENT
[FreeTextEntry1] : Ms. Glass is a 41 year old woman with h/o B/L breast cysts with L breast cellulitis, on Augmentin, improving.\par \par Plan:\par  - 8/2021 L US f/u resolution of breast infection and f/u appearance of mural irregularity of underlying cyst\par  - 11/2021 B/L SM and US\par  - RTO after screening imaging or sooner if any new complaints

## 2021-05-29 ENCOUNTER — TRANSCRIPTION ENCOUNTER (OUTPATIENT)
Age: 41
End: 2021-05-29

## 2021-06-01 ENCOUNTER — TRANSCRIPTION ENCOUNTER (OUTPATIENT)
Age: 41
End: 2021-06-01

## 2021-06-01 LAB
25(OH)D3 SERPL-MCNC: 38 NG/ML
ALBUMIN SERPL ELPH-MCNC: 4.8 G/DL
ALP BLD-CCNC: 49 U/L
ALT SERPL-CCNC: 8 U/L
ANION GAP SERPL CALC-SCNC: 14 MMOL/L
APPEARANCE: CLEAR
AST SERPL-CCNC: 12 U/L
BACTERIA: NEGATIVE
BASOPHILS # BLD AUTO: 0.03 K/UL
BASOPHILS NFR BLD AUTO: 0.4 %
BILIRUB SERPL-MCNC: 0.3 MG/DL
BILIRUBIN URINE: NEGATIVE
BLOOD URINE: NEGATIVE
BUN SERPL-MCNC: 13 MG/DL
CALCIUM SERPL-MCNC: 10.2 MG/DL
CHLORIDE SERPL-SCNC: 100 MMOL/L
CHOLEST SERPL-MCNC: 155 MG/DL
CO2 SERPL-SCNC: 25 MMOL/L
COLOR: YELLOW
CREAT SERPL-MCNC: 0.95 MG/DL
EOSINOPHIL # BLD AUTO: 0.19 K/UL
EOSINOPHIL NFR BLD AUTO: 2.5 %
ESTIMATED AVERAGE GLUCOSE: 108 MG/DL
GLUCOSE QUALITATIVE U: NEGATIVE
GLUCOSE SERPL-MCNC: 86 MG/DL
HBA1C MFR BLD HPLC: 5.4 %
HCT VFR BLD CALC: 42.4 %
HDLC SERPL-MCNC: 43 MG/DL
HGB BLD-MCNC: 13.2 G/DL
HYALINE CASTS: 1 /LPF
IMM GRANULOCYTES NFR BLD AUTO: 0.3 %
KETONES URINE: ABNORMAL
LDLC SERPL CALC-MCNC: 79 MG/DL
LEUKOCYTE ESTERASE URINE: NEGATIVE
LYMPHOCYTES # BLD AUTO: 2.01 K/UL
LYMPHOCYTES NFR BLD AUTO: 26.9 %
MAN DIFF?: NORMAL
MCHC RBC-ENTMCNC: 29.6 PG
MCHC RBC-ENTMCNC: 31.1 GM/DL
MCV RBC AUTO: 95.1 FL
MICROSCOPIC-UA: NORMAL
MONOCYTES # BLD AUTO: 0.6 K/UL
MONOCYTES NFR BLD AUTO: 8 %
NEUTROPHILS # BLD AUTO: 4.62 K/UL
NEUTROPHILS NFR BLD AUTO: 61.9 %
NITRITE URINE: NEGATIVE
NONHDLC SERPL-MCNC: 112 MG/DL
PH URINE: 6.5
PLATELET # BLD AUTO: 292 K/UL
POTASSIUM SERPL-SCNC: 4.4 MMOL/L
PROT SERPL-MCNC: 7.6 G/DL
PROTEIN URINE: NORMAL
RBC # BLD: 4.46 M/UL
RBC # FLD: 12.8 %
RED BLOOD CELLS URINE: 1 /HPF
SODIUM SERPL-SCNC: 139 MMOL/L
SPECIFIC GRAVITY URINE: 1.02
SQUAMOUS EPITHELIAL CELLS: 1 /HPF
TRIGL SERPL-MCNC: 169 MG/DL
TSH SERPL-ACNC: 1.31 UIU/ML
UROBILINOGEN URINE: NORMAL
WBC # FLD AUTO: 7.47 K/UL
WHITE BLOOD CELLS URINE: 0 /HPF

## 2021-07-05 ENCOUNTER — RESULT REVIEW (OUTPATIENT)
Age: 41
End: 2021-07-05

## 2021-08-24 ENCOUNTER — APPOINTMENT (OUTPATIENT)
Dept: ULTRASOUND IMAGING | Facility: IMAGING CENTER | Age: 41
End: 2021-08-24

## 2021-09-17 ENCOUNTER — RESULT REVIEW (OUTPATIENT)
Age: 41
End: 2021-09-17

## 2021-09-17 ENCOUNTER — OUTPATIENT (OUTPATIENT)
Dept: OUTPATIENT SERVICES | Facility: HOSPITAL | Age: 41
LOS: 1 days | End: 2021-09-17
Payer: COMMERCIAL

## 2021-09-17 ENCOUNTER — APPOINTMENT (OUTPATIENT)
Dept: ULTRASOUND IMAGING | Facility: CLINIC | Age: 41
End: 2021-09-17
Payer: COMMERCIAL

## 2021-09-17 DIAGNOSIS — Z00.8 ENCOUNTER FOR OTHER GENERAL EXAMINATION: ICD-10-CM

## 2021-09-17 PROCEDURE — 76641 ULTRASOUND BREAST COMPLETE: CPT

## 2021-09-17 PROCEDURE — 76642 ULTRASOUND BREAST LIMITED: CPT | Mod: 26,LT

## 2021-09-17 PROCEDURE — 76642 ULTRASOUND BREAST LIMITED: CPT

## 2021-10-27 ENCOUNTER — LABORATORY RESULT (OUTPATIENT)
Age: 41
End: 2021-10-27

## 2021-10-27 ENCOUNTER — APPOINTMENT (OUTPATIENT)
Dept: SURGICAL ONCOLOGY | Facility: CLINIC | Age: 41
End: 2021-10-27
Payer: COMMERCIAL

## 2021-10-27 VITALS
BODY MASS INDEX: 22.45 KG/M2 | HEIGHT: 62 IN | DIASTOLIC BLOOD PRESSURE: 80 MMHG | WEIGHT: 122 LBS | OXYGEN SATURATION: 98 % | HEART RATE: 81 BPM | RESPIRATION RATE: 17 BRPM | SYSTOLIC BLOOD PRESSURE: 114 MMHG

## 2021-10-27 PROCEDURE — 99213 OFFICE O/P EST LOW 20 MIN: CPT

## 2021-10-27 PROCEDURE — 10021 FNA BX W/O IMG GDN 1ST LES: CPT

## 2021-10-27 NOTE — ASSESSMENT
[FreeTextEntry1] : Ms. Glass is a 41 year old woman with h/o B/L breast cysts with recent L breast cellulitis, resolved.\par \par We reviewed her recent follow-up US, no evidence of residual infection.\par \par The patient has noted enlarging breast cysts bilaterally, up to 3.7 cm on the right, 5.1 cm on the left. She is interested in draining these today. The patient tolerated the procedure well.\par \par Plan:\par  - follow-up cytology\par  - 11/2021 B/L SM and US (patient will delay a few months because she is in the process of IVF/egg retrieval)\par  - RTO 1 year

## 2021-10-27 NOTE — PROCEDURE
[FreeTextEntry1] : cyst aspiration [FreeTextEntry2] : enlarging breast cysts [FreeTextEntry3] : After discussing risk/benefits/alternatives, the patient consented to a Right breast aspiration.\par \par The skin was cleansed with betadine and anesthetized with ethyl chloride spray. A total of 4 cc of 1% lidocaine with epi was injected into both breasts. An 18 g needle was inserted into the left breast abscess under US guidance with collapse seen of the cyst cavity. Drainage was a clear, straw color, approximately 25 cc. Next the right breast cysts were aspirated, first 11:00 (approx 15 cc of pink-serous clear fluid), and then 1:00 (approximately 7 cc of straw colored fluid with sediment).  Pressure was held over the needle sites and hemostasis was achieved. Bandaids were placed on top. The patient tolerated the procedure well.  [___ ml Inj] : [unfilled] ~Uml [1%] : 1% [With Epi] : with epinephrine [Betadine] : using betadine [FreeTextEntry8] : Left breast 11:00, Right breast 11:00 and 1:00

## 2021-10-27 NOTE — PHYSICAL EXAM
[Normocephalic] : normocephalic [Atraumatic] : atraumatic [EOMI] : extra ocular movement intact [PERRL] : pupils equal, round and reactive to light [Sclera nonicteric] : sclera nonicteric [Supple] : supple [No Supraclavicular Adenopathy] : no supraclavicular adenopathy [No Cervical Adenopathy] : no cervical adenopathy [No Thyromegaly] : no thyromegaly [Examined in the supine and seated position] : examined in the supine and seated position [Symmetrical] : symmetrical [None] : no ptosis [No Nipple Retraction] : no left nipple retraction [No Nipple Discharge] : no left nipple discharge [Breast Nipple Inversion] : nipples not inverted [Breast Nipple Retraction] : nipples not retracted [Breast Nipple Flattening] : nipples not flattened [Breast Nipple Fissures] : nipples not fissured [Breast Abnormal Lactation (Galactorrhea)] : no galactorrhea [Breast Abnormal Secretion Bloody Fluid] : no bloody discharge [Breast Abnormal Secretion Serous Fluid] : no serous discharge [Breast Abnormal Secretion Opalescent Fluid] : no milky discharge [No Axillary Lymphadenopathy] : no left axillary lymphadenopathy [No Edema] : no edema [No Swelling] : no swelling [Full ROM] : full range of motion [No Rashes] : no rashes [No Ulceration] : no ulceration [de-identified] : non-labored respirations  [de-identified] : 3-4 cm mass at 11:00 and 2 cm mass at 1:00, well-circumscribed, mobile [de-identified] : 5 cm mass at 11:00, well-circumscribed, mobile

## 2021-10-27 NOTE — HISTORY OF PRESENT ILLNESS
[FreeTextEntry1] : Ms. Glass is a 41 year old female with known history of cystic lesions of the bilateral breasts recently with  Left breast cellulitis, here for follow-up \par \par Prior history:\par The patient noted the pain started last week under the left nipple. It felt painful for a day or so without any fever or chills or erythema. She then noticed erythema under the left breast and reported a fever of 101 at home. She went to the ED on 5/21/21 where she was evaluated. She was afebrile, WBC 11.4. US demonstrated hyperemia and edema in the area of erythema overlying two cystic structures, no abscess. She was started on Unasyn, monitored in the CDU, and discharged with Augmentin. Currently she feels that it is mostly resolved, but not completely back to normal yet. She still has 2.5 days of Augmentin left.\par \par She has never had any erythema such as this before and denies any instances of nipple discharge or bleeding. \par \par Prior imaging:\par 11/09/2020 B/L DM ([facility]) showed extremely dense breasts\par  - R multiple scattered cysts up to 3.7 cm\par  - L multiple scattered cysts up to 5.1 cm \par  - BR2 \par \par 5/21/2021 L US ([facility] ED)\par - 2 focal cystic lesions are seen in the area of swelling measuring 10 x 8 x 14 mm at the 6:00 position and 9 x 5 x 8 mm at the 5-6:00 position. Both cystic lesions demonstrate mural irregularity and internal echoes.\par - There is mild edematous change and hyperemia in the region.\par \par 9/17/2021 L US ([facility])\par  - No suspicious solid mass.  Multiple cysts are identified up to 5.2 cm in size. The previously noted findings of infection at the 6:00 region are no longer present.\par \par Interval history:\par The patient reports that the prior breast abscess has fully healed. She currently just feels breast lumps in both breasts. The right breast has had a longstand cyst that previously got smaller when she cut down on caffeine intake, but has since regrown in size. She's also notice a new lump grow adjacent to it. In the left breast, she reports that the cyst has been present for a few years, and has also gotten bigger. She is interested in having these cysts drained today. Has no other breast complaints-no skin changes, nipple discharge.\par

## 2021-10-27 NOTE — CONSULT LETTER
[Dear  ___] : Dear  [unfilled], [Courtesy Letter:] : I had the pleasure of seeing your patient, [unfilled], in my office today. [Please see my note below.] : Please see my note below. [Sincerely,] : Sincerely, [FreeTextEntry3] : Carmen Ordoñez MD\par Breast Surgeon\par Division of Surgical Oncology\par Department of Surgery\par [address] [place] [address]\[place] [address] \par Tel: [phone]\par Fax: [phone]\par Email: [email or web address]

## 2021-11-10 ENCOUNTER — APPOINTMENT (OUTPATIENT)
Dept: ULTRASOUND IMAGING | Facility: CLINIC | Age: 41
End: 2021-11-10
Payer: COMMERCIAL

## 2021-11-10 ENCOUNTER — RESULT REVIEW (OUTPATIENT)
Age: 41
End: 2021-11-10

## 2021-11-10 ENCOUNTER — OUTPATIENT (OUTPATIENT)
Dept: OUTPATIENT SERVICES | Facility: HOSPITAL | Age: 41
LOS: 1 days | End: 2021-11-10
Payer: COMMERCIAL

## 2021-11-10 ENCOUNTER — APPOINTMENT (OUTPATIENT)
Dept: MAMMOGRAPHY | Facility: CLINIC | Age: 41
End: 2021-11-10
Payer: COMMERCIAL

## 2021-11-10 DIAGNOSIS — Z00.00 ENCOUNTER FOR GENERAL ADULT MEDICAL EXAMINATION WITHOUT ABNORMAL FINDINGS: ICD-10-CM

## 2021-11-10 PROCEDURE — 77067 SCR MAMMO BI INCL CAD: CPT

## 2021-11-10 PROCEDURE — 76641 ULTRASOUND BREAST COMPLETE: CPT

## 2021-11-10 PROCEDURE — 77063 BREAST TOMOSYNTHESIS BI: CPT | Mod: 26

## 2021-11-10 PROCEDURE — 77067 SCR MAMMO BI INCL CAD: CPT | Mod: 26

## 2021-11-10 PROCEDURE — 76641 ULTRASOUND BREAST COMPLETE: CPT | Mod: 26,50

## 2021-11-10 PROCEDURE — 77063 BREAST TOMOSYNTHESIS BI: CPT

## 2022-06-20 ENCOUNTER — TRANSCRIPTION ENCOUNTER (OUTPATIENT)
Age: 42
End: 2022-06-20

## 2022-07-11 ENCOUNTER — NON-APPOINTMENT (OUTPATIENT)
Age: 42
End: 2022-07-11

## 2022-10-17 ENCOUNTER — NON-APPOINTMENT (OUTPATIENT)
Age: 42
End: 2022-10-17

## 2022-10-19 ENCOUNTER — APPOINTMENT (OUTPATIENT)
Dept: INTERNAL MEDICINE | Facility: CLINIC | Age: 42
End: 2022-10-19

## 2022-10-19 ENCOUNTER — NON-APPOINTMENT (OUTPATIENT)
Age: 42
End: 2022-10-19

## 2022-10-19 VITALS
SYSTOLIC BLOOD PRESSURE: 110 MMHG | DIASTOLIC BLOOD PRESSURE: 72 MMHG | HEART RATE: 81 BPM | BODY MASS INDEX: 22.45 KG/M2 | OXYGEN SATURATION: 94 % | HEIGHT: 62 IN | WEIGHT: 122 LBS | TEMPERATURE: 98.2 F

## 2022-10-19 DIAGNOSIS — N60.02 SOLITARY CYST OF RIGHT BREAST: ICD-10-CM

## 2022-10-19 DIAGNOSIS — Z87.898 PERSONAL HISTORY OF OTHER SPECIFIED CONDITIONS: ICD-10-CM

## 2022-10-19 DIAGNOSIS — N61.0 MASTITIS WITHOUT ABSCESS: ICD-10-CM

## 2022-10-19 DIAGNOSIS — N60.01 SOLITARY CYST OF RIGHT BREAST: ICD-10-CM

## 2022-10-19 PROCEDURE — 93000 ELECTROCARDIOGRAM COMPLETE: CPT

## 2022-10-19 PROCEDURE — 36415 COLL VENOUS BLD VENIPUNCTURE: CPT

## 2022-10-19 PROCEDURE — 99396 PREV VISIT EST AGE 40-64: CPT | Mod: 25

## 2022-10-19 NOTE — HISTORY OF PRESENT ILLNESS
[de-identified] : 42-year-old female who is here for an annual physical.  \par She did 2 rounds of freezing her eggs. Has about 20 eggs frozen. She may do another round. She did it at [facility] in [place] but she moved to the [facility] so she may do it at [facility] in the [facility].\par Had COVID and couldn’t exercise for about 1 month.  \par diet is good\par exercises regularly\par pap 8/21\par mammo 11/21.  Sees a breast surgeon\par \par   [FreeTextEntry1] : Annual physical

## 2022-10-19 NOTE — PLAN
[FreeTextEntry1] : Depression screening negative\par BP is controlled\par Check labs today, including CBC, CMP, TSH, HbA1c, lipids. declined HIV/STD screening. Blood was collected in the office. \par Continue exercise\par pap due\par mammo scheduled\par declined flu shot\par f/u 1 year or prn\par

## 2022-10-19 NOTE — PHYSICAL EXAM
[No Acute Distress] : no acute distress [Well Nourished] : well nourished [Well Developed] : well developed [Well-Appearing] : well-appearing [Normal Sclera/Conjunctiva] : normal sclera/conjunctiva [PERRL] : pupils equal round and reactive to light [EOMI] : extraocular movements intact [Normal Outer Ear/Nose] : the outer ears and nose were normal in appearance [No JVD] : no jugular venous distention [No Lymphadenopathy] : no lymphadenopathy [Supple] : supple [Thyroid Normal, No Nodules] : the thyroid was normal and there were no nodules present [No Respiratory Distress] : no respiratory distress  [No Accessory Muscle Use] : no accessory muscle use [Clear to Auscultation] : lungs were clear to auscultation bilaterally [Normal Rate] : normal rate  [Regular Rhythm] : with a regular rhythm [Normal S1, S2] : normal S1 and S2 [No Murmur] : no murmur heard [No Carotid Bruits] : no carotid bruits [No Varicosities] : no varicosities [Pedal Pulses Present] : the pedal pulses are present [No Edema] : there was no peripheral edema [No Extremity Clubbing/Cyanosis] : no extremity clubbing/cyanosis [No Nipple Discharge] : no nipple discharge [No Axillary Lymphadenopathy] : no axillary lymphadenopathy [Soft] : abdomen soft [Non Tender] : non-tender [Non-distended] : non-distended [No HSM] : no HSM [Normal Bowel Sounds] : normal bowel sounds [Normal Posterior Cervical Nodes] : no posterior cervical lymphadenopathy [Normal Anterior Cervical Nodes] : no anterior cervical lymphadenopathy [No CVA Tenderness] : no CVA  tenderness [No Spinal Tenderness] : no spinal tenderness [No Joint Swelling] : no joint swelling [Grossly Normal Strength/Tone] : grossly normal strength/tone [No Rash] : no rash [Coordination Grossly Intact] : coordination grossly intact [No Focal Deficits] : no focal deficits [Normal Gait] : normal gait [Deep Tendon Reflexes (DTR)] : deep tendon reflexes were 2+ and symmetric [Normal Affect] : the affect was normal [Alert and Oriented x3] : oriented to person, place, and time [Normal Insight/Judgement] : insight and judgment were intact [Normal Appearance] : normal in appearance [No Masses] : no palpable masses

## 2022-10-19 NOTE — HEALTH RISK ASSESSMENT
[0] : 2) Feeling down, depressed, or hopeless: Not at all (0) [Patient reported mammogram was normal] : Patient reported mammogram was normal [None] : None [Employed] : employed [Single] : single [Patient reported PAP Smear was normal] : Patient reported PAP Smear was normal [Never] : Never [No falls in past year] : Patient reported no falls in the past year [PHQ-2 Negative - No further assessment needed] : PHQ-2 Negative - No further assessment needed [de-identified] : social [[id number]] : 0 [Alone] : lives alone [Reports changes in hearing] : Reports no changes in hearing [Reports changes in vision] : Reports no changes in vision [Reports changes in dental health] : Reports no changes in dental health [MammogramDate] : 11/21 [PapSmearDate] : 08/21 [HIVDate] : 05/21 [HIVComments] : negative [HepatitisCDate] : 05/21 [HepatitisCComments] : negative [FreeTextEntry2] :

## 2022-10-26 ENCOUNTER — TRANSCRIPTION ENCOUNTER (OUTPATIENT)
Age: 42
End: 2022-10-26

## 2022-10-26 LAB
ALBUMIN SERPL ELPH-MCNC: 4.4 G/DL
ALP BLD-CCNC: 37 U/L
ALT SERPL-CCNC: 13 U/L
ANION GAP SERPL CALC-SCNC: 12 MMOL/L
APPEARANCE: CLEAR
AST SERPL-CCNC: 18 U/L
BACTERIA: NEGATIVE
BASOPHILS # BLD AUTO: 0.03 K/UL
BASOPHILS NFR BLD AUTO: 0.5 %
BILIRUB SERPL-MCNC: 0.3 MG/DL
BILIRUBIN URINE: NEGATIVE
BLOOD URINE: NEGATIVE
BUN SERPL-MCNC: 8 MG/DL
CALCIUM SERPL-MCNC: 9.7 MG/DL
CHLORIDE SERPL-SCNC: 100 MMOL/L
CHOLEST SERPL-MCNC: 216 MG/DL
CO2 SERPL-SCNC: 26 MMOL/L
COLOR: NORMAL
CREAT SERPL-MCNC: 0.92 MG/DL
EGFR: 80 ML/MIN/1.73M2
EOSINOPHIL # BLD AUTO: 0.17 K/UL
EOSINOPHIL NFR BLD AUTO: 2.6 %
ESTIMATED AVERAGE GLUCOSE: 105 MG/DL
GLUCOSE QUALITATIVE U: NEGATIVE
GLUCOSE SERPL-MCNC: 94 MG/DL
HBA1C MFR BLD HPLC: 5.3 %
HCT VFR BLD CALC: 40.1 %
HDLC SERPL-MCNC: 78 MG/DL
HGB BLD-MCNC: 13.3 G/DL
HYALINE CASTS: 0 /LPF
IMM GRANULOCYTES NFR BLD AUTO: 0.2 %
KETONES URINE: NORMAL
LDLC SERPL CALC-MCNC: 120 MG/DL
LEUKOCYTE ESTERASE URINE: NEGATIVE
LYMPHOCYTES # BLD AUTO: 1.68 K/UL
LYMPHOCYTES NFR BLD AUTO: 25.2 %
MAN DIFF?: NORMAL
MCHC RBC-ENTMCNC: 32.8 PG
MCHC RBC-ENTMCNC: 33.2 GM/DL
MCV RBC AUTO: 99 FL
MICROSCOPIC-UA: NORMAL
MONOCYTES # BLD AUTO: 0.6 K/UL
MONOCYTES NFR BLD AUTO: 9 %
NEUTROPHILS # BLD AUTO: 4.17 K/UL
NEUTROPHILS NFR BLD AUTO: 62.5 %
NITRITE URINE: NEGATIVE
NONHDLC SERPL-MCNC: 138 MG/DL
PH URINE: 7
PLATELET # BLD AUTO: 236 K/UL
POTASSIUM SERPL-SCNC: 4.3 MMOL/L
PROT SERPL-MCNC: 7.3 G/DL
PROTEIN URINE: NEGATIVE
RBC # BLD: 4.05 M/UL
RBC # FLD: 13.3 %
RED BLOOD CELLS URINE: 2 /HPF
SODIUM SERPL-SCNC: 138 MMOL/L
SPECIFIC GRAVITY URINE: 1.01
SQUAMOUS EPITHELIAL CELLS: 1 /HPF
TRIGL SERPL-MCNC: 89 MG/DL
TSH SERPL-ACNC: 1.35 UIU/ML
UROBILINOGEN URINE: NORMAL
WBC # FLD AUTO: 6.66 K/UL
WHITE BLOOD CELLS URINE: 0 /HPF

## 2022-11-11 ENCOUNTER — RESULT REVIEW (OUTPATIENT)
Age: 42
End: 2022-11-11

## 2022-11-11 ENCOUNTER — APPOINTMENT (OUTPATIENT)
Dept: MAMMOGRAPHY | Facility: CLINIC | Age: 42
End: 2022-11-11

## 2022-11-11 ENCOUNTER — OUTPATIENT (OUTPATIENT)
Dept: OUTPATIENT SERVICES | Facility: HOSPITAL | Age: 42
LOS: 1 days | End: 2022-11-11
Payer: COMMERCIAL

## 2022-11-11 ENCOUNTER — APPOINTMENT (OUTPATIENT)
Dept: ULTRASOUND IMAGING | Facility: CLINIC | Age: 42
End: 2022-11-11

## 2022-11-11 DIAGNOSIS — Z00.8 ENCOUNTER FOR OTHER GENERAL EXAMINATION: ICD-10-CM

## 2022-11-11 DIAGNOSIS — R92.8 OTHER ABNORMAL AND INCONCLUSIVE FINDINGS ON DIAGNOSTIC IMAGING OF BREAST: ICD-10-CM

## 2022-11-11 PROBLEM — N60.02 CYST OF LEFT BREAST: Status: ACTIVE | Noted: 2022-11-11

## 2022-11-11 PROBLEM — N60.01 CYST OF RIGHT BREAST: Status: ACTIVE | Noted: 2022-11-11

## 2022-11-11 PROBLEM — R92.2 DENSE BREASTS: Status: ACTIVE | Noted: 2020-11-11

## 2022-11-11 PROCEDURE — 77063 BREAST TOMOSYNTHESIS BI: CPT | Mod: 26

## 2022-11-11 PROCEDURE — 77067 SCR MAMMO BI INCL CAD: CPT | Mod: 26

## 2022-11-11 PROCEDURE — 77063 BREAST TOMOSYNTHESIS BI: CPT

## 2022-11-11 PROCEDURE — 77067 SCR MAMMO BI INCL CAD: CPT

## 2022-11-11 PROCEDURE — 76641 ULTRASOUND BREAST COMPLETE: CPT

## 2022-11-11 PROCEDURE — 76641 ULTRASOUND BREAST COMPLETE: CPT | Mod: 26,50

## 2022-11-16 ENCOUNTER — APPOINTMENT (OUTPATIENT)
Dept: SURGICAL ONCOLOGY | Facility: CLINIC | Age: 42
End: 2022-11-16

## 2022-11-16 VITALS
OXYGEN SATURATION: 98 % | RESPIRATION RATE: 16 BRPM | SYSTOLIC BLOOD PRESSURE: 107 MMHG | DIASTOLIC BLOOD PRESSURE: 74 MMHG | WEIGHT: 120 LBS | HEIGHT: 62 IN | BODY MASS INDEX: 22.08 KG/M2

## 2022-11-16 DIAGNOSIS — N60.01 SOLITARY CYST OF RIGHT BREAST: ICD-10-CM

## 2022-11-16 DIAGNOSIS — Z12.39 ENCOUNTER FOR OTHER SCREENING FOR MALIGNANT NEOPLASM OF BREAST: ICD-10-CM

## 2022-11-16 DIAGNOSIS — N60.02 SOLITARY CYST OF LEFT BREAST: ICD-10-CM

## 2022-11-16 DIAGNOSIS — R92.2 INCONCLUSIVE MAMMOGRAM: ICD-10-CM

## 2022-11-16 PROCEDURE — 99212 OFFICE O/P EST SF 10 MIN: CPT

## 2022-11-16 NOTE — CONSULT LETTER
[Dear  ___] : Dear  [unfilled], [Courtesy Letter:] : I had the pleasure of seeing your patient, [unfilled], in my office today. [Please see my note below.] : Please see my note below. [Sincerely,] : Sincerely, [FreeTextEntry3] : Carmen Ordoñez MD\par Breast Surgeon\par Division of Surgical Oncology\par Department of Surgery\par [address] [place] [address]\[place] [address] \par Tel: [phone]\par Fax: [phone]\par Email: [email or web address]

## 2022-11-16 NOTE — ASSESSMENT
[FreeTextEntry1] : Ms. Glass is a 42 year old woman with h/o B/L breast cysts with recent L breast cellulitis, resolved.\par \par Recent imaging revealed B/L breast cysts, BR2. Exam today compatible with diffuse breast cysts bilaterally.\par \par Given elevated [facility] lifetime breast cancer risk 34.7%, would recommend screening MRI.\par \par Plan:\par  - MRI 5/2023\par  - Next SM/US 11/2023\par  - RTO after MRI if any abnormalities, otherwise can follow-up yearly

## 2022-11-16 NOTE — PHYSICAL EXAM
[Normocephalic] : normocephalic [Atraumatic] : atraumatic [EOMI] : extra ocular movement intact [PERRL] : pupils equal, round and reactive to light [Sclera nonicteric] : sclera nonicteric [Supple] : supple [No Supraclavicular Adenopathy] : no supraclavicular adenopathy [No Cervical Adenopathy] : no cervical adenopathy [No Thyromegaly] : no thyromegaly [Examined in the supine and seated position] : examined in the supine and seated position [Symmetrical] : symmetrical [None] : no ptosis [No Nipple Retraction] : no left nipple retraction [No Nipple Discharge] : no left nipple discharge [Breast Nipple Inversion] : nipples not inverted [Breast Nipple Retraction] : nipples not retracted [Breast Nipple Flattening] : nipples not flattened [Breast Nipple Fissures] : nipples not fissured [Breast Abnormal Lactation (Galactorrhea)] : no galactorrhea [Breast Abnormal Secretion Bloody Fluid] : no bloody discharge [Breast Abnormal Secretion Serous Fluid] : no serous discharge [Breast Abnormal Secretion Opalescent Fluid] : no milky discharge [No Axillary Lymphadenopathy] : no left axillary lymphadenopathy [No Edema] : no edema [No Swelling] : no swelling [Full ROM] : full range of motion [No Rashes] : no rashes [No Ulceration] : no ulceration [de-identified] : non-labored respirations  [de-identified] : diffuse lumpiness in B/L breasts

## 2022-11-16 NOTE — DATA REVIEWED
[FreeTextEntry1] : 10/27/2021 B/L FNA Cytology\par 11/10/2021 B/L SM and US\par 11/11/2022 B/L SM and US

## 2022-11-16 NOTE — HISTORY OF PRESENT ILLNESS
[FreeTextEntry1] : Ms. Glass is a 42 year old female with known history of cystic lesions of the bilateral breasts recently with  Left breast cellulitis, here for follow-up \par \par Prior history:\par The patient noted the pain started last week under the left nipple. It felt painful for a day or so without any fever or chills or erythema. She then noticed erythema under the left breast and reported a fever of 101 at home. She went to the ED on 5/21/21 where she was evaluated. She was afebrile, WBC 11.4. US demonstrated hyperemia and edema in the area of erythema overlying two cystic structures, no abscess. She was started on Unasyn, monitored in the CDU, and discharged with Augmentin. Currently she feels that it is mostly resolved, but not completely back to normal yet. She still has 2.5 days of Augmentin left.\par \par She has never had any erythema such as this before and denies any instances of nipple discharge or bleeding. \par \par Prior imaging:\par 11/09/2020 B/L DM ([facility]) showed extremely dense breasts\par  - R multiple scattered cysts up to 3.7 cm\par  - L multiple scattered cysts up to 5.1 cm \par  - BR2 \par \par 5/21/2021 L US ([facility] ED)\par - 2 focal cystic lesions are seen in the area of swelling measuring 10 x 8 x 14 mm at the 6:00 position and 9 x 5 x 8 mm at the 5-6:00 position. Both cystic lesions demonstrate mural irregularity and internal echoes.\par - There is mild edematous change and hyperemia in the region.\par \par 9/17/2021 L US ([facility])\par  - No suspicious solid mass.  Multiple cysts are identified up to 5.2 cm in size. The previously noted findings of infection at the 6:00 region are no longer present.\par \par 10/27/2021: The patient reports that the prior breast abscess has fully healed. She currently just feels breast lumps in both breasts. The right breast has had a longstand cyst that previously got smaller when she cut down on caffeine intake, but has since regrown in size. She's also notice a new lump grow adjacent to it. In the left breast, she reports that the cyst has been present for a few years, and has also gotten bigger. She is interested in having these cysts drained today. Has no other breast complaints-no skin changes, nipple discharge.\par \par 10/27/2021 B/L FNA Cytology: negative\par \par 11/10/2021 B/L SM ([facility]) TC 21.9%\par - B/L calcs w/o significant change\par - B/L multiple circumscribed fluctuating masses\par \par 11/10/2021 B/L US\par - R multiple cysts up to 2.5 cm in size\par - L multiple cysts up to 3.1 cm in size\par - BR2\par \par 11/11/2022 B/L SM ([facility]) TC 34.7%\par - B/L scattered and loosely grouped benign-appearing calcs\par - B/L fluctuating circumscribed and obscured masses which correlate w/ multiple cysts on same-day US\par \par 11/11/2022 B/L US ([facility])\par - B/L multiple benign-appearing cysts and cyst clusters\par - BR2\par \par Interval History: \par The patient reports that she has no breast complaints at this time. She still feels lumpiness in her breasts, but seems to be different from prior lumps. Denies any skin changes, any nipple discharge.

## 2022-12-08 NOTE — ED ADULT NURSE NOTE - NS ED PATIENT SAFETY CONCERN
Report given to Sean AVALOS on 14W, endorsed potential need for straight cath, or sanderson, per MD.    No

## 2024-03-04 ENCOUNTER — NON-APPOINTMENT (OUTPATIENT)
Age: 44
End: 2024-03-04

## 2024-03-04 ENCOUNTER — APPOINTMENT (OUTPATIENT)
Dept: INTERNAL MEDICINE | Facility: CLINIC | Age: 44
End: 2024-03-04
Payer: COMMERCIAL

## 2024-03-04 DIAGNOSIS — H57.89 OTHER SPECIFIED DISORDERS OF EYE AND ADNEXA: ICD-10-CM

## 2024-03-04 PROCEDURE — 99212 OFFICE O/P EST SF 10 MIN: CPT

## 2024-03-04 NOTE — HISTORY OF PRESENT ILLNESS
[Home] : at home, [unfilled] , at the time of the visit. [Other Location: e.g. Home (Enter Location, City,State)___] : at [unfilled] [Verbal consent obtained from patient] : the patient, [unfilled] [FreeTextEntry8] : 44 y/o female who c/o left lateral eye redness for 10 days. She was using eye drops but she felt like it caused more irritation. She felt like it worsened when it was windy. She spoke to a family member who is an opthalmologist who told it is episcleritis.  She denies pain, itchiness, watering. Her vision is fine.

## 2024-03-04 NOTE — PLAN
[FreeTextEntry1] : She will continue warm compresses. She is allergic to Ibuprofen. If no improvement in 5 days will give a topical steroid and refer to opthalmology

## 2024-03-07 ENCOUNTER — TRANSCRIPTION ENCOUNTER (OUTPATIENT)
Age: 44
End: 2024-03-07

## 2024-04-15 ENCOUNTER — RESULT REVIEW (OUTPATIENT)
Age: 44
End: 2024-04-15

## 2024-04-15 ENCOUNTER — APPOINTMENT (OUTPATIENT)
Dept: MAMMOGRAPHY | Facility: IMAGING CENTER | Age: 44
End: 2024-04-15
Payer: COMMERCIAL

## 2024-04-15 ENCOUNTER — OUTPATIENT (OUTPATIENT)
Dept: OUTPATIENT SERVICES | Facility: HOSPITAL | Age: 44
LOS: 1 days | End: 2024-04-15
Payer: COMMERCIAL

## 2024-04-15 ENCOUNTER — APPOINTMENT (OUTPATIENT)
Dept: ULTRASOUND IMAGING | Facility: IMAGING CENTER | Age: 44
End: 2024-04-15
Payer: COMMERCIAL

## 2024-04-15 DIAGNOSIS — N60.02 SOLITARY CYST OF LEFT BREAST: ICD-10-CM

## 2024-04-15 DIAGNOSIS — R92.30 DENSE BREASTS, UNSPECIFIED: ICD-10-CM

## 2024-04-15 DIAGNOSIS — N60.01 SOLITARY CYST OF RIGHT BREAST: ICD-10-CM

## 2024-04-15 PROCEDURE — 76641 ULTRASOUND BREAST COMPLETE: CPT | Mod: 26,50

## 2024-04-15 PROCEDURE — 77067 SCR MAMMO BI INCL CAD: CPT | Mod: 26

## 2024-04-15 PROCEDURE — 76641 ULTRASOUND BREAST COMPLETE: CPT

## 2024-04-15 PROCEDURE — 77063 BREAST TOMOSYNTHESIS BI: CPT

## 2024-04-15 PROCEDURE — 77067 SCR MAMMO BI INCL CAD: CPT

## 2024-04-15 PROCEDURE — 77063 BREAST TOMOSYNTHESIS BI: CPT | Mod: 26

## 2024-04-16 ENCOUNTER — APPOINTMENT (OUTPATIENT)
Dept: INTERNAL MEDICINE | Facility: CLINIC | Age: 44
End: 2024-04-16
Payer: COMMERCIAL

## 2024-04-16 ENCOUNTER — NON-APPOINTMENT (OUTPATIENT)
Age: 44
End: 2024-04-16

## 2024-04-16 VITALS
DIASTOLIC BLOOD PRESSURE: 80 MMHG | HEART RATE: 67 BPM | SYSTOLIC BLOOD PRESSURE: 112 MMHG | WEIGHT: 127 LBS | OXYGEN SATURATION: 99 % | HEIGHT: 62 IN | BODY MASS INDEX: 23.37 KG/M2

## 2024-04-16 DIAGNOSIS — Z00.00 ENCOUNTER FOR GENERAL ADULT MEDICAL EXAMINATION W/OUT ABNORMAL FINDINGS: ICD-10-CM

## 2024-04-16 DIAGNOSIS — Z13.1 ENCOUNTER FOR SCREENING FOR DIABETES MELLITUS: ICD-10-CM

## 2024-04-16 DIAGNOSIS — R20.8 OTHER DISTURBANCES OF SKIN SENSATION: ICD-10-CM

## 2024-04-16 DIAGNOSIS — Z13.29 ENCOUNTER FOR SCREENING FOR OTHER SUSPECTED ENDOCRINE DISORDER: ICD-10-CM

## 2024-04-16 DIAGNOSIS — Z13.0 ENCOUNTER FOR SCREENING FOR DISEASES OF THE BLOOD AND BLOOD-FORMING ORGANS AND CERTAIN DISORDERS INVOLVING THE IMMUNE MECHANISM: ICD-10-CM

## 2024-04-16 DIAGNOSIS — Z13.220 ENCOUNTER FOR SCREENING FOR LIPOID DISORDERS: ICD-10-CM

## 2024-04-16 PROCEDURE — 36415 COLL VENOUS BLD VENIPUNCTURE: CPT

## 2024-04-16 PROCEDURE — 99396 PREV VISIT EST AGE 40-64: CPT | Mod: 25

## 2024-04-16 PROCEDURE — 93000 ELECTROCARDIOGRAM COMPLETE: CPT | Mod: 59

## 2024-04-16 NOTE — PHYSICAL EXAM
[No Acute Distress] : no acute distress [Well Nourished] : well nourished [Well Developed] : well developed [Well-Appearing] : well-appearing [Normal Sclera/Conjunctiva] : normal sclera/conjunctiva [PERRL] : pupils equal round and reactive to light [EOMI] : extraocular movements intact [Normal Outer Ear/Nose] : the outer ears and nose were normal in appearance [No JVD] : no jugular venous distention [No Lymphadenopathy] : no lymphadenopathy [Supple] : supple [Thyroid Normal, No Nodules] : the thyroid was normal and there were no nodules present [No Respiratory Distress] : no respiratory distress  [No Accessory Muscle Use] : no accessory muscle use [Clear to Auscultation] : lungs were clear to auscultation bilaterally [Normal Rate] : normal rate  [Regular Rhythm] : with a regular rhythm [Normal S1, S2] : normal S1 and S2 [No Murmur] : no murmur heard [No Carotid Bruits] : no carotid bruits [No Varicosities] : no varicosities [Pedal Pulses Present] : the pedal pulses are present [No Extremity Clubbing/Cyanosis] : no extremity clubbing/cyanosis [No Edema] : there was no peripheral edema [Normal Appearance] : normal in appearance [No Nipple Discharge] : no nipple discharge [No Axillary Lymphadenopathy] : no axillary lymphadenopathy [Soft] : abdomen soft [Non Tender] : non-tender [Non-distended] : non-distended [No Masses] : no abdominal mass palpated [No HSM] : no HSM [Normal Bowel Sounds] : normal bowel sounds [Normal Posterior Cervical Nodes] : no posterior cervical lymphadenopathy [Normal Anterior Cervical Nodes] : no anterior cervical lymphadenopathy [No CVA Tenderness] : no CVA  tenderness [No Spinal Tenderness] : no spinal tenderness [No Joint Swelling] : no joint swelling [Grossly Normal Strength/Tone] : grossly normal strength/tone [No Rash] : no rash [Coordination Grossly Intact] : coordination grossly intact [No Focal Deficits] : no focal deficits [Normal Gait] : normal gait [Deep Tendon Reflexes (DTR)] : deep tendon reflexes were 2+ and symmetric [Normal Affect] : the affect was normal [Alert and Oriented x3] : oriented to person, place, and time [Normal Insight/Judgement] : insight and judgment were intact [Normal Oropharynx] : the oropharynx was normal [Normal TMs] : both tympanic membranes were normal [de-identified] : cold tips of toes

## 2024-04-16 NOTE — HISTORY OF PRESENT ILLNESS
[FreeTextEntry1] : Annual physical [de-identified] : 43-year-old female who is here for an annual physical.   She did 2 rounds of freezing her eggs. Has about 20 eggs frozen.  Tore her calf muscle in the fall. Couldnt walk for about 1 month. She didnt see anyone abou tit. Didnt go PT Since that time she has noted that if she sits with her legs crisscrossed that her toes get dark.  She is also noted that her fingers and toes are cold. Her last visit was for eye redness.  She saw an ophthalmologist who treated her with a topical steroid.  The eye doctor thought that may be it is something systemic because he also noted slight redness of the other eye. She was given a prescription for a breast MRI from her breast doctor because her calculated risk of breast cancer was high but she has not done it yet because she is concerned about the effects gadolinium on the brain  diet is good pap 2023 mammo 4/24

## 2024-04-16 NOTE — REVIEW OF SYSTEMS
[Negative] : Heme/Lymph [Vision Problems] : no vision problems [FreeTextEntry3] : as noted in HPI  [de-identified] : as noted in HPI

## 2024-04-16 NOTE — HEALTH RISK ASSESSMENT
[No falls in past year] : Patient reported no falls in the past year [0] : 2) Feeling down, depressed, or hopeless: Not at all (0) [PHQ-2 Negative - No further assessment needed] : PHQ-2 Negative - No further assessment needed [Patient reported mammogram was normal] : Patient reported mammogram was normal [Patient reported PAP Smear was normal] : Patient reported PAP Smear was normal [None] : None [Alone] : lives alone [Employed] : employed [Single] : single [de-identified] : social [UTR1Ghfrx] : 0 [Reports changes in hearing] : Reports no changes in hearing [Reports changes in vision] : Reports no changes in vision [Reports changes in dental health] : Reports no changes in dental health [MammogramDate] : 04/24 [PapSmearDate] : 01/23 [HIVDate] : 05/21 [HIVComments] : negative [HepatitisCDate] : 05/21 [HepatitisCComments] : negative [FreeTextEntry2] :  [Never] : Never

## 2024-04-16 NOTE — PLAN
[FreeTextEntry1] : Cold fingers and toes May have Raynaud's phenomenon Check [place]  Eye redness May be allergies.  She can try an over-the-counter antihistamine.  Health maintenance Depression screening negative BP is controlled Check labs today, including CBC, CMP, TSH, HbA1c, lipids. declined HIV/STD screening. Blood was collected in the office.  Resume exercise as tolerated pap schedule Mammogram done yesterday.  She is considering having the MRI done.  She will discuss with the breast surgeon.  f/u 1 year or prn

## 2024-04-23 ENCOUNTER — NON-APPOINTMENT (OUTPATIENT)
Age: 44
End: 2024-04-23

## 2024-04-23 LAB
ALBUMIN SERPL ELPH-MCNC: 4.5 G/DL
ALP BLD-CCNC: 42 U/L
ALT SERPL-CCNC: 19 U/L
ANA SER IF-ACNC: NEGATIVE
ANION GAP SERPL CALC-SCNC: 10 MMOL/L
APPEARANCE: CLEAR
AST SERPL-CCNC: 16 U/L
BACTERIA: NEGATIVE /HPF
BASOPHILS # BLD AUTO: 0.01 K/UL
BASOPHILS NFR BLD AUTO: 0.2 %
BILIRUB SERPL-MCNC: 0.2 MG/DL
BILIRUBIN URINE: NEGATIVE
BLOOD URINE: NEGATIVE
BUN SERPL-MCNC: 11 MG/DL
CALCIUM SERPL-MCNC: 9.6 MG/DL
CAST: 0 /LPF
CHLORIDE SERPL-SCNC: 104 MMOL/L
CHOLEST SERPL-MCNC: 211 MG/DL
CO2 SERPL-SCNC: 27 MMOL/L
COLOR: YELLOW
CREAT SERPL-MCNC: 0.95 MG/DL
EGFR: 77 ML/MIN/1.73M2
EOSINOPHIL # BLD AUTO: 0.22 K/UL
EOSINOPHIL NFR BLD AUTO: 4 %
EPITHELIAL CELLS: 2 /HPF
ESTIMATED AVERAGE GLUCOSE: 103 MG/DL
GLUCOSE QUALITATIVE U: NEGATIVE MG/DL
GLUCOSE SERPL-MCNC: 96 MG/DL
HBA1C MFR BLD HPLC: 5.2 %
HCT VFR BLD CALC: 43 %
HDLC SERPL-MCNC: 62 MG/DL
HGB BLD-MCNC: 13.7 G/DL
IMM GRANULOCYTES NFR BLD AUTO: 0.4 %
KETONES URINE: NEGATIVE MG/DL
LDLC SERPL CALC-MCNC: 122 MG/DL
LEUKOCYTE ESTERASE URINE: ABNORMAL
LYMPHOCYTES # BLD AUTO: 1.68 K/UL
LYMPHOCYTES NFR BLD AUTO: 30.9 %
MAN DIFF?: NORMAL
MCHC RBC-ENTMCNC: 30.2 PG
MCHC RBC-ENTMCNC: 31.9 GM/DL
MCV RBC AUTO: 94.7 FL
MICROSCOPIC-UA: NORMAL
MONOCYTES # BLD AUTO: 0.45 K/UL
MONOCYTES NFR BLD AUTO: 8.3 %
NEUTROPHILS # BLD AUTO: 3.06 K/UL
NEUTROPHILS NFR BLD AUTO: 56.2 %
NITRITE URINE: NEGATIVE
NONHDLC SERPL-MCNC: 150 MG/DL
PH URINE: 6.5
PLATELET # BLD AUTO: 231 K/UL
POTASSIUM SERPL-SCNC: 4.6 MMOL/L
PROT SERPL-MCNC: 6.8 G/DL
PROTEIN URINE: NEGATIVE MG/DL
RBC # BLD: 4.54 M/UL
RBC # FLD: 13 %
RED BLOOD CELLS URINE: 2 /HPF
SODIUM SERPL-SCNC: 140 MMOL/L
SPECIFIC GRAVITY URINE: 1.02
TRIGL SERPL-MCNC: 159 MG/DL
TSH SERPL-ACNC: 2.77 UIU/ML
UROBILINOGEN URINE: 0.2 MG/DL
WBC # FLD AUTO: 5.44 K/UL
WHITE BLOOD CELLS URINE: 1 /HPF

## 2025-07-14 ENCOUNTER — NON-APPOINTMENT (OUTPATIENT)
Age: 45
End: 2025-07-14

## 2025-07-15 ENCOUNTER — APPOINTMENT (OUTPATIENT)
Dept: INTERNAL MEDICINE | Facility: CLINIC | Age: 45
End: 2025-07-15
Payer: COMMERCIAL

## 2025-07-15 VITALS
WEIGHT: 130 LBS | SYSTOLIC BLOOD PRESSURE: 112 MMHG | DIASTOLIC BLOOD PRESSURE: 70 MMHG | HEART RATE: 92 BPM | OXYGEN SATURATION: 98 % | HEIGHT: 62 IN | BODY MASS INDEX: 23.92 KG/M2

## 2025-07-15 PROBLEM — R92.30 DENSE BREASTS: Status: ACTIVE | Noted: 2020-11-11

## 2025-07-15 PROCEDURE — 36415 COLL VENOUS BLD VENIPUNCTURE: CPT

## 2025-07-15 PROCEDURE — 99214 OFFICE O/P EST MOD 30 MIN: CPT

## 2025-07-16 LAB
ALBUMIN SERPL ELPH-MCNC: 4.5 G/DL
ALP BLD-CCNC: 54 U/L
ALT SERPL-CCNC: 15 U/L
ANION GAP SERPL CALC-SCNC: 14 MMOL/L
AST SERPL-CCNC: 17 U/L
B BURGDOR AB SER-IMP: NEGATIVE
B BURGDOR IGG+IGM SER QL: 0.11 INDEX
BILIRUB SERPL-MCNC: <0.2 MG/DL
BUN SERPL-MCNC: 18 MG/DL
CALCIUM SERPL-MCNC: 9.7 MG/DL
CHLORIDE SERPL-SCNC: 103 MMOL/L
CO2 SERPL-SCNC: 22 MMOL/L
CREAT SERPL-MCNC: 1.02 MG/DL
EGFRCR SERPLBLD CKD-EPI 2021: 70 ML/MIN/1.73M2
ESTIMATED AVERAGE GLUCOSE: 111 MG/DL
FOLATE SERPL-MCNC: 15.8 NG/ML
GLUCOSE SERPL-MCNC: 95 MG/DL
HBA1C MFR BLD HPLC: 5.5 %
HCT VFR BLD CALC: 42.1 %
HGB BLD-MCNC: 13.3 G/DL
MCHC RBC-ENTMCNC: 30.8 PG
MCHC RBC-ENTMCNC: 31.6 G/DL
MCV RBC AUTO: 97.5 FL
PLATELET # BLD AUTO: 247 K/UL
POTASSIUM SERPL-SCNC: 4.4 MMOL/L
PROT SERPL-MCNC: 7.1 G/DL
RBC # BLD: 4.32 M/UL
RBC # FLD: 13.3 %
SODIUM SERPL-SCNC: 139 MMOL/L
TSH SERPL-ACNC: 1.56 UIU/ML
VIT B12 SERPL-MCNC: 616 PG/ML
WBC # FLD AUTO: 7.03 K/UL

## 2025-08-05 ENCOUNTER — APPOINTMENT (OUTPATIENT)
Dept: INTERNAL MEDICINE | Facility: CLINIC | Age: 45
End: 2025-08-05
Payer: COMMERCIAL

## 2025-08-05 VITALS
DIASTOLIC BLOOD PRESSURE: 90 MMHG | SYSTOLIC BLOOD PRESSURE: 110 MMHG | HEIGHT: 62 IN | HEART RATE: 65 BPM | WEIGHT: 135 LBS | OXYGEN SATURATION: 98 % | BODY MASS INDEX: 24.84 KG/M2

## 2025-08-05 DIAGNOSIS — Z13.1 ENCOUNTER FOR SCREENING FOR DIABETES MELLITUS: ICD-10-CM

## 2025-08-05 DIAGNOSIS — M79.673 PAIN IN UNSPECIFIED FOOT: ICD-10-CM

## 2025-08-05 DIAGNOSIS — Z00.00 ENCOUNTER FOR GENERAL ADULT MEDICAL EXAMINATION W/OUT ABNORMAL FINDINGS: ICD-10-CM

## 2025-08-05 DIAGNOSIS — Z13.220 ENCOUNTER FOR SCREENING FOR LIPOID DISORDERS: ICD-10-CM

## 2025-08-05 DIAGNOSIS — R20.0 ANESTHESIA OF SKIN: ICD-10-CM

## 2025-08-05 PROCEDURE — 99396 PREV VISIT EST AGE 40-64: CPT

## 2025-08-05 PROCEDURE — 93000 ELECTROCARDIOGRAM COMPLETE: CPT

## 2025-08-05 PROCEDURE — 36415 COLL VENOUS BLD VENIPUNCTURE: CPT

## 2025-08-05 PROCEDURE — 99212 OFFICE O/P EST SF 10 MIN: CPT | Mod: 25

## 2025-08-07 ENCOUNTER — TRANSCRIPTION ENCOUNTER (OUTPATIENT)
Age: 45
End: 2025-08-07

## 2025-08-07 LAB
25(OH)D3 SERPL-MCNC: 48.4 NG/ML
APPEARANCE: CLEAR
BACTERIA: NEGATIVE /HPF
BILIRUBIN URINE: NEGATIVE
BLOOD URINE: NEGATIVE
CAST: 0 /LPF
CHOLEST SERPL-MCNC: 243 MG/DL
COLOR: YELLOW
EPITHELIAL CELLS: 0 /HPF
ESTIMATED AVERAGE GLUCOSE: 108 MG/DL
GLUCOSE QUALITATIVE U: NEGATIVE MG/DL
HBA1C MFR BLD HPLC: 5.4 %
HDLC SERPL-MCNC: 63 MG/DL
KETONES URINE: NEGATIVE MG/DL
LDLC SERPL-MCNC: 153 MG/DL
LEUKOCYTE ESTERASE URINE: NEGATIVE
MICROSCOPIC-UA: NORMAL
NITRITE URINE: NEGATIVE
NONHDLC SERPL-MCNC: 179 MG/DL
PH URINE: 6.5
PROTEIN URINE: NEGATIVE MG/DL
RED BLOOD CELLS URINE: 0 /HPF
SPECIFIC GRAVITY URINE: 1.01
TRIGL SERPL-MCNC: 148 MG/DL
UROBILINOGEN URINE: 0.2 MG/DL
WHITE BLOOD CELLS URINE: 0 /HPF

## 2025-08-08 ENCOUNTER — APPOINTMENT (OUTPATIENT)
Dept: RADIOLOGY | Facility: IMAGING CENTER | Age: 45
End: 2025-08-08
Payer: COMMERCIAL

## 2025-08-08 ENCOUNTER — OUTPATIENT (OUTPATIENT)
Dept: OUTPATIENT SERVICES | Facility: HOSPITAL | Age: 45
LOS: 1 days | End: 2025-08-08
Payer: COMMERCIAL

## 2025-08-08 DIAGNOSIS — R20.0 ANESTHESIA OF SKIN: ICD-10-CM

## 2025-08-08 DIAGNOSIS — M79.673 PAIN IN UNSPECIFIED FOOT: ICD-10-CM

## 2025-08-08 PROCEDURE — 73620 X-RAY EXAM OF FOOT: CPT | Mod: 26,50

## 2025-08-08 PROCEDURE — 73620 X-RAY EXAM OF FOOT: CPT

## 2025-08-11 ENCOUNTER — TRANSCRIPTION ENCOUNTER (OUTPATIENT)
Age: 45
End: 2025-08-11

## 2025-08-14 ENCOUNTER — TRANSCRIPTION ENCOUNTER (OUTPATIENT)
Age: 45
End: 2025-08-14

## 2025-09-19 ENCOUNTER — APPOINTMENT (OUTPATIENT)
Dept: INTERNAL MEDICINE | Facility: CLINIC | Age: 45
End: 2025-09-19
Payer: COMMERCIAL

## 2025-09-19 DIAGNOSIS — R20.2 PARESTHESIA OF SKIN: ICD-10-CM

## 2025-09-19 PROCEDURE — 99213 OFFICE O/P EST LOW 20 MIN: CPT | Mod: 95
